# Patient Record
Sex: MALE | Race: WHITE | NOT HISPANIC OR LATINO | Employment: FULL TIME | URBAN - METROPOLITAN AREA
[De-identification: names, ages, dates, MRNs, and addresses within clinical notes are randomized per-mention and may not be internally consistent; named-entity substitution may affect disease eponyms.]

---

## 2018-01-24 ENCOUNTER — APPOINTMENT (EMERGENCY)
Dept: CT IMAGING | Facility: HOSPITAL | Age: 63
End: 2018-01-24
Payer: COMMERCIAL

## 2018-01-24 ENCOUNTER — HOSPITAL ENCOUNTER (EMERGENCY)
Facility: HOSPITAL | Age: 63
Discharge: HOME | End: 2018-01-24
Admitting: EMERGENCY MEDICINE
Payer: COMMERCIAL

## 2018-01-24 VITALS
WEIGHT: 238 LBS | HEIGHT: 69 IN | DIASTOLIC BLOOD PRESSURE: 66 MMHG | OXYGEN SATURATION: 98 % | HEART RATE: 59 BPM | RESPIRATION RATE: 18 BRPM | SYSTOLIC BLOOD PRESSURE: 125 MMHG | TEMPERATURE: 97.6 F | BODY MASS INDEX: 35.25 KG/M2

## 2018-01-24 DIAGNOSIS — H53.9 VISUAL DISTURBANCE: ICD-10-CM

## 2018-01-24 DIAGNOSIS — M62.82 RHABDOMYOLYSIS: Primary | ICD-10-CM

## 2018-01-24 LAB
ALBUMIN SERPL BCP-MCNC: 4.3 G/DL (ref 3.5–5)
ALP SERPL-CCNC: 58 U/L (ref 46–116)
ALT SERPL W P-5'-P-CCNC: 42 U/L (ref 12–78)
ANION GAP SERPL CALCULATED.3IONS-SCNC: 10 MMOL/L (ref 4–13)
APTT PPP: 31 SECONDS (ref 23–35)
AST SERPL W P-5'-P-CCNC: 42 U/L (ref 5–45)
BACTERIA UR QL AUTO: ABNORMAL /HPF
BASOPHILS # BLD AUTO: 0.03 THOUSANDS/ΜL (ref 0–0.1)
BASOPHILS NFR BLD AUTO: 1 % (ref 0–1)
BILIRUB SERPL-MCNC: 0.41 MG/DL (ref 0.2–1)
BILIRUB UR QL STRIP: NEGATIVE
BUN SERPL-MCNC: 13 MG/DL (ref 5–25)
CALCIUM SERPL-MCNC: 8.9 MG/DL (ref 8.3–10.1)
CHLORIDE SERPL-SCNC: 103 MMOL/L (ref 100–108)
CK MB SERPL-MCNC: 2.5 NG/ML (ref 0–5)
CK MB SERPL-MCNC: 2.7 NG/ML (ref 0–5)
CK MB SERPL-MCNC: <1 % (ref 0–2.5)
CK MB SERPL-MCNC: <1 % (ref 0–2.5)
CK SERPL-CCNC: 1191 U/L (ref 39–308)
CK SERPL-CCNC: 928 U/L (ref 39–308)
CLARITY UR: CLEAR
CLARITY, POC: CLEAR
CO2 SERPL-SCNC: 28 MMOL/L (ref 21–32)
COLOR UR: YELLOW
COLOR, POC: YELLOW
CREAT SERPL-MCNC: 1.19 MG/DL (ref 0.6–1.3)
EOSINOPHIL # BLD AUTO: 0.14 THOUSAND/ΜL (ref 0–0.61)
EOSINOPHIL NFR BLD AUTO: 2 % (ref 0–6)
ERYTHROCYTE [DISTWIDTH] IN BLOOD BY AUTOMATED COUNT: 13.2 % (ref 11.6–15.1)
GFR SERPL CREATININE-BSD FRML MDRD: 64 ML/MIN/1.73SQ M
GLUCOSE SERPL-MCNC: 108 MG/DL (ref 65–140)
GLUCOSE UR STRIP-MCNC: NEGATIVE MG/DL
HCT VFR BLD AUTO: 42.4 % (ref 36.5–49.3)
HGB BLD-MCNC: 14.6 G/DL (ref 12–17)
HGB UR QL STRIP.AUTO: ABNORMAL
INR PPP: 0.96 (ref 0.86–1.16)
KETONES UR STRIP-MCNC: NEGATIVE MG/DL
LEUKOCYTE ESTERASE UR QL STRIP: NEGATIVE
LYMPHOCYTES # BLD AUTO: 2.12 THOUSANDS/ΜL (ref 0.6–4.47)
LYMPHOCYTES NFR BLD AUTO: 33 % (ref 14–44)
MAGNESIUM SERPL-MCNC: 2 MG/DL (ref 1.6–2.6)
MCH RBC QN AUTO: 28.3 PG (ref 26.8–34.3)
MCHC RBC AUTO-ENTMCNC: 34.4 G/DL (ref 31.4–37.4)
MCV RBC AUTO: 82 FL (ref 82–98)
MONOCYTES # BLD AUTO: 0.57 THOUSAND/ΜL (ref 0.17–1.22)
MONOCYTES NFR BLD AUTO: 9 % (ref 4–12)
NEUTROPHILS # BLD AUTO: 3.65 THOUSANDS/ΜL (ref 1.85–7.62)
NEUTS SEG NFR BLD AUTO: 55 % (ref 43–75)
NITRITE UR QL STRIP: NEGATIVE
NON-SQ EPI CELLS URNS QL MICRO: ABNORMAL /HPF
NRBC BLD AUTO-RTO: 0 /100 WBCS
PH UR STRIP.AUTO: 6 [PH] (ref 4.5–8)
PLATELET # BLD AUTO: 169 THOUSANDS/UL (ref 149–390)
PMV BLD AUTO: 9.8 FL (ref 8.9–12.7)
POTASSIUM SERPL-SCNC: 3.8 MMOL/L (ref 3.5–5.3)
PROT SERPL-MCNC: 7.5 G/DL (ref 6.4–8.2)
PROT UR STRIP-MCNC: NEGATIVE MG/DL
PROTHROMBIN TIME: 12.8 SECONDS (ref 12.1–14.4)
RBC # BLD AUTO: 5.16 MILLION/UL (ref 3.88–5.62)
RBC #/AREA URNS AUTO: ABNORMAL /HPF
SODIUM SERPL-SCNC: 141 MMOL/L (ref 136–145)
SP GR UR STRIP.AUTO: 1.01 (ref 1–1.03)
UROBILINOGEN UR QL STRIP.AUTO: 0.2 E.U./DL
WBC # BLD AUTO: 6.51 THOUSAND/UL (ref 4.31–10.16)
WBC #/AREA URNS AUTO: ABNORMAL /HPF

## 2018-01-24 PROCEDURE — 96361 HYDRATE IV INFUSION ADD-ON: CPT

## 2018-01-24 PROCEDURE — 99284 EMERGENCY DEPT VISIT MOD MDM: CPT

## 2018-01-24 PROCEDURE — 82550 ASSAY OF CK (CPK): CPT | Performed by: NURSE PRACTITIONER

## 2018-01-24 PROCEDURE — 80053 COMPREHEN METABOLIC PANEL: CPT | Performed by: NURSE PRACTITIONER

## 2018-01-24 PROCEDURE — 82553 CREATINE MB FRACTION: CPT | Performed by: NURSE PRACTITIONER

## 2018-01-24 PROCEDURE — 96360 HYDRATION IV INFUSION INIT: CPT

## 2018-01-24 PROCEDURE — 81001 URINALYSIS AUTO W/SCOPE: CPT

## 2018-01-24 PROCEDURE — 81002 URINALYSIS NONAUTO W/O SCOPE: CPT | Performed by: NURSE PRACTITIONER

## 2018-01-24 PROCEDURE — 85610 PROTHROMBIN TIME: CPT | Performed by: NURSE PRACTITIONER

## 2018-01-24 PROCEDURE — 85730 THROMBOPLASTIN TIME PARTIAL: CPT | Performed by: NURSE PRACTITIONER

## 2018-01-24 PROCEDURE — 36415 COLL VENOUS BLD VENIPUNCTURE: CPT | Performed by: NURSE PRACTITIONER

## 2018-01-24 PROCEDURE — 85025 COMPLETE CBC W/AUTO DIFF WBC: CPT | Performed by: NURSE PRACTITIONER

## 2018-01-24 PROCEDURE — 70450 CT HEAD/BRAIN W/O DYE: CPT

## 2018-01-24 PROCEDURE — 83735 ASSAY OF MAGNESIUM: CPT | Performed by: NURSE PRACTITIONER

## 2018-01-24 RX ORDER — ROSUVASTATIN CALCIUM 5 MG/1
5 TABLET, COATED ORAL DAILY
COMMUNITY
End: 2018-05-30 | Stop reason: ALTCHOICE

## 2018-01-24 RX ORDER — TAMSULOSIN HYDROCHLORIDE 0.4 MG/1
0.4 CAPSULE ORAL
COMMUNITY

## 2018-01-24 RX ORDER — LISINOPRIL 20 MG/1
20 TABLET ORAL DAILY
COMMUNITY
End: 2018-05-30 | Stop reason: ALTCHOICE

## 2018-01-24 RX ORDER — ASPIRIN 81 MG/1
81 TABLET ORAL DAILY
COMMUNITY
End: 2018-05-30 | Stop reason: ALTCHOICE

## 2018-01-24 RX ORDER — AMLODIPINE BESYLATE 10 MG/1
10 TABLET ORAL DAILY
COMMUNITY
End: 2018-05-30 | Stop reason: ALTCHOICE

## 2018-01-24 RX ADMIN — SODIUM CHLORIDE 1000 ML: 0.9 INJECTION, SOLUTION INTRAVENOUS at 21:18

## 2018-01-24 RX ADMIN — SODIUM CHLORIDE 1000 ML: 0.9 INJECTION, SOLUTION INTRAVENOUS at 21:24

## 2018-01-25 NOTE — DISCHARGE INSTRUCTIONS
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  Use saline nasal spray for sinus congestion  Rhabdomyolysis   WHAT YOU NEED TO KNOW:   Rhabdomyolysis is a condition where injured muscles release harmful substances into the bloodstream  These substances include potassium, phosphate, creatinine kinase, and myoglobin  Large amounts of these substances may damage your kidneys and other organs  DISCHARGE INSTRUCTIONS:   Call 911 if:   · You have chest pain  · Your heart is beating faster than usual or has a strange rhythm  Return to the emergency department if:   · Your urine is dark or tea-colored or has blood in it  · You have pain, swelling, or weakness in your arms or legs that does not go away or gets worse  · You are urinating less than usual or not able to urinate  Contact your healthcare provider if:   · You have questions or concerns about your condition or care  Follow up with your healthcare provider as directed: You may need to return to have blood tests done  Write down your questions so you remember to ask them during your visits  Self-care:   · Drink liquids as directed  Ask how much liquid to drink each day and which liquids are best for you  Drink more liquids if you are doing strenuous work, exercise, and if it is warm outside  Liquids help flush substances from your body  · Do not drink alcohol  Heavy alcohol use may increase your risk for rhabdomyolysis  © 2017 [address] Information is for End User's use only and may not be sold, redistributed or otherwise used for commercial purposes   All illustrations and images included in CareNotes® are the copyrighted property of [employer]  or Oscar Navarrete  The above information is an  only  It is not intended as medical advice for individual conditions or treatments  Talk to your doctor, nurse or pharmacist before following any medical regimen to see if it is safe and effective for you  Blurred Vision   WHAT YOU NEED TO KNOW:   Blurred vision is when you cannot see fine details  You may have blurred vision if you are nearsighted or farsighted and you need glasses  Blurred vision may be caused by a corneal abrasion (scratch on the cornea) or a corneal ulcer (open sore)  You may have blurred vision if your eye came into contact with a chemical  A foreign body or infection may also cause blurred vision  Medical conditions, such as cataracts, glaucoma, detached retina, and nerve disorders can also cause blurred vision  Blurred vision may also be caused by a concussion or a tumor  If you have diabetes, you may develop diabetic retinopathy  Diabetic retinopathy damages the blood vessels of your retina  DISCHARGE INSTRUCTIONS:   Return to the emergency department if:   · You have weakness in an arm or leg, difficulty speaking or seeing, and a severe headache  · You have a fever, eye pain, or discharge  · You have a sudden loss of vision  Contact your healthcare provider if:   · Your blurred vision gets worse  · Your blurred vision is worse in the morning  · You have a sudden headache or eye pain  · Your eye has swelling, redness, or discharge  · You see floaters, flashes of light, fine dots, or cobweb shapes  · You have questions or concerns about your condition or care  Medicines: You may  need any of the following:  · Prescription pain medicine  may be given  Ask how to take this medicine safely  · Antibiotics  help prevent or treat an eye infection caused by bacteria  It may be given as eyedrops or an ointment  · Take your medicine as directed  Contact your healthcare provider if you think your medicine is not helping or if you have side effects  Tell him of her if you are allergic to any medicine  Keep a list of the medicines, vitamins, and herbs you take  Include the amounts, and when and why you take them  Bring the list or the pill bottles to follow-up visits  Carry your medicine list with you in case of an emergency  Manage your blurred vision:  Your healthcare provider may ask you to do any of the following:  · Use artificial tears  to keep your eye moist or to soothe your irritated eye  · Apply a cool compress  to decrease any swelling or pain  Wet a clean washcloth with cool water and place it on your eye  Use the cool compress as often as directed  · Wear an eye patch as directed  to protect your eye  Follow up with your healthcare provider as directed: You may need other eye exams and medicines  Write down your questions so you remember to ask them during your visits  © 2017 [address]  Information is for End User's use only and may not be sold, redistributed or otherwise used for commercial purposes  All illustrations and images included in CareNotes® are the copyrighted property of [employer]  or [address]  The above information is an  only  It is not intended as medical advice for individual conditions or treatments  Talk to your doctor, nurse or pharmacist before following any medical regimen to see if it is safe and effective for you

## 2018-05-30 ENCOUNTER — OFFICE VISIT (OUTPATIENT)
Dept: URGENT CARE | Age: 63
End: 2018-05-30
Payer: COMMERCIAL

## 2018-05-30 VITALS
OXYGEN SATURATION: 97 % | RESPIRATION RATE: 16 BRPM | SYSTOLIC BLOOD PRESSURE: 179 MMHG | WEIGHT: 238 LBS | TEMPERATURE: 97.1 F | DIASTOLIC BLOOD PRESSURE: 90 MMHG | HEART RATE: 58 BPM | BODY MASS INDEX: 35.25 KG/M2 | HEIGHT: 69 IN

## 2018-05-30 DIAGNOSIS — J01.20 ACUTE NON-RECURRENT ETHMOIDAL SINUSITIS: Primary | ICD-10-CM

## 2018-05-30 PROCEDURE — 99203 OFFICE O/P NEW LOW 30 MIN: CPT | Performed by: PHYSICIAN ASSISTANT

## 2018-05-30 PROCEDURE — S9088 SERVICES PROVIDED IN URGENT: HCPCS | Performed by: PHYSICIAN ASSISTANT

## 2018-05-30 RX ORDER — BENZONATATE 100 MG/1
100 CAPSULE ORAL 3 TIMES DAILY PRN
Qty: 15 CAPSULE | Refills: 0 | Status: SHIPPED | OUTPATIENT
Start: 2018-05-30 | End: 2018-09-29

## 2018-05-30 RX ORDER — AZITHROMYCIN 250 MG/1
TABLET, FILM COATED ORAL
Qty: 6 TABLET | Refills: 0 | Status: SHIPPED | OUTPATIENT
Start: 2018-05-30 | End: 2018-06-03

## 2018-05-30 RX ORDER — FLUTICASONE PROPIONATE 50 MCG
2 SPRAY, SUSPENSION (ML) NASAL DAILY
Qty: 16 G | Refills: 0 | Status: SHIPPED | OUTPATIENT
Start: 2018-05-30 | End: 2018-09-29

## 2018-05-30 NOTE — PROGRESS NOTES
[facility]        NAME: Merced Mendosa is a 58 y o  male  : 1955    MRN: [id number]  DATE: May 30, 2018  TIME: 11:48 AM    Assessment and Plan   Acute non-recurrent ethmoidal sinusitis [J01 20]  1  Acute non-recurrent ethmoidal sinusitis  azithromycin (ZITHROMAX) 250 mg tablet    benzonatate (TESSALON PERLES) 100 mg capsule    fluticasone (FLONASE) 50 mcg/act nasal spray         Patient Instructions     Continue all medications  Motrin and/or Tylenol as needed for fever and pain  Follow up with PCP in 3-5 days  Proceed to  ER if symptoms worsen  Chief Complaint     Chief Complaint   Patient presents with    Facial Pain     sinus pressure and pain about 1 week, fever last weekened, nasuea, cough with mucus, squeaking in ears and some ear pain in both ears         History of Present Illness       For one week sinus pressure, fever, cough and ear congestion      URI    This is a new problem  The current episode started in the past 7 days  The problem has been waxing and waning  Maximum temperature: subjective  The fever has been present for 1 to 2 days  Associated symptoms include congestion, coughing, ear pain, a plugged ear sensation, rhinorrhea and sinus pain  Pertinent negatives include no chest pain, diarrhea, dysuria, headaches, joint swelling, nausea, neck pain, rash, sore throat, swollen glands, vomiting or wheezing  He has tried nothing for the symptoms  Review of Systems   Review of Systems   Constitutional: Positive for fever  Negative for fatigue  HENT: Positive for congestion, ear pain, rhinorrhea and sinus pain  Negative for sore throat  Eyes: Negative  Respiratory: Positive for cough  Negative for wheezing  Cardiovascular: Negative  Negative for chest pain  Gastrointestinal: Negative  Negative for diarrhea, nausea and vomiting  Genitourinary: Negative for dysuria  Musculoskeletal: Negative  Negative for neck pain  Skin: Negative  Negative for rash  Neurological: Negative  Negative for headaches  Current Medications       Current Outpatient Prescriptions:     metoprolol tartrate (LOPRESSOR) 25 mg tablet, Take 25 mg by mouth every 12 (twelve) hours, Disp: , Rfl:     tamsulosin (FLOMAX) 0 4 mg, Take 0 4 mg by mouth daily with dinner, Disp: , Rfl:     azithromycin (ZITHROMAX) 250 mg tablet, Take 2 tablets today then 1 tablet daily x 4 days, Disp: 6 tablet, Rfl: 0    benzonatate (TESSALON PERLES) 100 mg capsule, Take 1 capsule (100 mg total) by mouth 3 (three) times a day as needed for cough, Disp: 15 capsule, Rfl: 0    fluticasone (FLONASE) 50 mcg/act nasal spray, 2 sprays into each nostril daily, Disp: 16 g, Rfl: 0    Current Allergies     Allergies as of 05/30/2018 - Reviewed 05/30/2018   Allergen Reaction Noted    Statins Other (See Comments) 01/25/2018            The following portions of the patient's history were reviewed and updated as appropriate: allergies, current medications, past family history, past medical history, past social history, past surgical history and problem list      Past Medical History:   Diagnosis Date    Hyperlipidemia     Hypertension     Renal disorder        Past Surgical History:   Procedure Laterality Date    CARPAL TUNNEL RELEASE      HERNIA REPAIR      KNEE ARTHROSCOPY      TONSILLECTOMY         No family history on file  Medications have been verified  Objective   BP (!) 179/90 (BP Location: Left arm, Patient Position: Sitting, Cuff Size: Adult)   Pulse 58   Temp (!) 97 1 °F (36 2 °C) (Tympanic)   Resp 16   Ht 5' 9" (1 753 m)   Wt 108 kg (238 lb)   SpO2 97%   BMI 35 15 kg/m²        Physical Exam     Physical Exam   Constitutional: He is oriented to person, place, and time  He appears well-developed and well-nourished  No distress  HENT:   Head: Normocephalic and atraumatic     Right Ear: External ear normal    Left Ear: External ear normal    Nose: Nose normal    Mouth/Throat: Oropharynx is clear and moist  No oropharyngeal exudate  Eyes: Conjunctivae are normal  Right eye exhibits no discharge  Left eye exhibits no discharge  Neck: Normal range of motion  Neck supple  Cardiovascular: Normal rate, regular rhythm, normal heart sounds and intact distal pulses  No murmur heard  Pulmonary/Chest: Effort normal and breath sounds normal  No respiratory distress  He has no rales  Abdominal: Soft  Bowel sounds are normal  There is no tenderness  Musculoskeletal: Normal range of motion  Lymphadenopathy:     He has no cervical adenopathy  Neurological: He is alert and oriented to person, place, and time  Skin: Skin is warm and dry  Psychiatric: He has a normal mood and affect  Nursing note and vitals reviewed

## 2018-05-30 NOTE — PATIENT INSTRUCTIONS
Continue all medications  Motrin and/or Tylenol as needed for fever and pain  Follow up with PCP in 3-5 days  Proceed to  ER if symptoms worsen     Sinusitis   AMBULATORY CARE:   Sinusitis  is inflammation or infection of your sinuses  It is most often caused by a virus  Acute sinusitis may last up to 12 weeks  Chronic sinusitis lasts longer than 12 weeks  Recurrent sinusitis means you have 4 or more times in 1 year  Common symptoms include the following:   · Fever    · Pain, pressure, redness, or swelling around the forehead, cheeks, or eyes    · Thick yellow or green discharge from your nose    · Tenderness when you touch your face over your sinuses    · Dry cough that happens mostly at night or when you lie down    · Headache and face pain that is worse when you lean forward    · Tooth pain, or pain when you chew  Seek care immediately if:   · Your eye and eyelid are red, swollen, and painful  · You cannot open your eye  · You have vision changes, such as double vision  · Your eyeball bulges out or you cannot move your eye  · You are more sleepy than normal, or you notice changes in your ability to think, move, or talk  · You have a stiff neck, a fever, or a bad headache  · You have swelling of your forehead or scalp  Contact your healthcare provider if:   · Your symptoms do not improve after 3 days  · Your symptoms do not go away after 10 days  · You have nausea and are vomiting  · Your nose is bleeding  · You have questions or concerns about your condition or care  Treatment for sinusitis:  Your symptoms may go away on their own  Your healthcare provider may recommend watchful waiting for up to 10 days before starting antibiotics  You may  need any of the following:  · Acetaminophen  decreases pain and fever  It is available without a doctor's order  Ask how much to take and how often to take it  Follow directions   Read the labels of all other medicines you are using to see if they also contain acetaminophen, or ask your doctor or pharmacist  Acetaminophen can cause liver damage if not taken correctly  Do not use more than 4 grams (4,000 milligrams) total of acetaminophen in one day  · NSAIDs , such as ibuprofen, help decrease swelling, pain, and fever  This medicine is available with or without a doctor's order  NSAIDs can cause stomach bleeding or kidney problems in certain people  If you take blood thinner medicine, always ask your healthcare provider if NSAIDs are safe for you  Always read the medicine label and follow directions  · Nasal steroid sprays  may help decrease inflammation in your nose and sinuses  · Decongestants  help reduce swelling and drain mucus in the nose and sinuses  They may help you breathe easier  · Antihistamines  help dry mucus in the nose and relieve sneezing  · Antibiotics  help treat or prevent a bacterial infection  · Take your medicine as directed  Contact your healthcare provider if you think your medicine is not helping or if you have side effects  Tell him or her if you are allergic to any medicine  Keep a list of the medicines, vitamins, and herbs you take  Include the amounts, and when and why you take them  Bring the list or the pill bottles to follow-up visits  Carry your medicine list with you in case of an emergency  Self-care:   · Rinse your sinuses  Use a sinus rinse device to rinse your nasal passages with a saline (salt water) solution or distilled water  Do not use tap water  This will help thin the mucus in your nose and rinse away pollen and dirt  It will also help reduce swelling so you can breathe normally  Ask your healthcare provider how often to do this  · Breathe in steam   Heat a bowl of water until you see steam  Lean over the bowl and make a tent over your head with a large towel  Breathe deeply for about 20 minutes  Be careful not to get too close to the steam or burn yourself   Do this 3 times a day  You can also breathe deeply when you take a hot shower  · Sleep with your head elevated  Place an extra pillow under your head before you go to sleep to help your sinuses drain  · Drink liquids as directed  Ask your healthcare provider how much liquid to drink each day and which liquids are best for you  Liquids will thin the mucus in your nose and help it drain  Avoid drinks that contain alcohol or caffeine  · Do not smoke, and avoid secondhand smoke  Nicotine and other chemicals in cigarettes and cigars can make your symptoms worse  Ask your healthcare provider for information if you currently smoke and need help to quit  E-cigarettes or smokeless tobacco still contain nicotine  Talk to your healthcare provider before you use these products  Prevent the spread of germs that cause sinusitis:  Wash your hands often with soap and water  Wash your hands after you use the bathroom, change a child's diaper, or sneeze  Wash your hands before you prepare or eat food  Follow up with your healthcare provider as directed: You may be referred to an ear, nose, and throat specialist  Write down your questions so you remember to ask them during your visits  © 2017 [facility] Information is for End User's use only and may not be sold, redistributed or otherwise used for commercial purposes  All illustrations and images included in CareNotes® are the copyrighted property of [employer]  or [employer]  The above information is an  only  It is not intended as medical advice for individual conditions or treatments  Talk to your doctor, nurse or pharmacist before following any medical regimen to see if it is safe and effective for you

## 2018-09-29 ENCOUNTER — APPOINTMENT (EMERGENCY)
Dept: CT IMAGING | Facility: HOSPITAL | Age: 63
End: 2018-09-29
Payer: COMMERCIAL

## 2018-09-29 ENCOUNTER — HOSPITAL ENCOUNTER (EMERGENCY)
Facility: HOSPITAL | Age: 63
Discharge: HOME | End: 2018-09-29
Attending: EMERGENCY MEDICINE | Admitting: EMERGENCY MEDICINE
Payer: COMMERCIAL

## 2018-09-29 VITALS
SYSTOLIC BLOOD PRESSURE: 147 MMHG | OXYGEN SATURATION: 98 % | TEMPERATURE: 98 F | WEIGHT: 233 LBS | HEART RATE: 59 BPM | DIASTOLIC BLOOD PRESSURE: 67 MMHG | RESPIRATION RATE: 16 BRPM | BODY MASS INDEX: 34.41 KG/M2

## 2018-09-29 DIAGNOSIS — K52.9 ACUTE COLITIS: Primary | ICD-10-CM

## 2018-09-29 DIAGNOSIS — K62.5 RECTAL BLEEDING: ICD-10-CM

## 2018-09-29 LAB
ALBUMIN SERPL BCP-MCNC: 3.6 G/DL (ref 3.5–5)
ALP SERPL-CCNC: 57 U/L (ref 46–116)
ALT SERPL W P-5'-P-CCNC: 27 U/L (ref 12–78)
ANION GAP SERPL CALCULATED.3IONS-SCNC: 7 MMOL/L (ref 4–13)
AST SERPL W P-5'-P-CCNC: 18 U/L (ref 5–45)
BASOPHILS # BLD AUTO: 0.05 THOUSANDS/ΜL (ref 0–0.1)
BASOPHILS NFR BLD AUTO: 1 % (ref 0–1)
BILIRUB SERPL-MCNC: 0.35 MG/DL (ref 0.2–1)
BUN SERPL-MCNC: 15 MG/DL (ref 5–25)
CALCIUM SERPL-MCNC: 8.9 MG/DL (ref 8.3–10.1)
CHLORIDE SERPL-SCNC: 103 MMOL/L (ref 100–108)
CO2 SERPL-SCNC: 31 MMOL/L (ref 21–32)
CREAT SERPL-MCNC: 1.07 MG/DL (ref 0.6–1.3)
EOSINOPHIL # BLD AUTO: 0.1 THOUSAND/ΜL (ref 0–0.61)
EOSINOPHIL NFR BLD AUTO: 2 % (ref 0–6)
ERYTHROCYTE [DISTWIDTH] IN BLOOD BY AUTOMATED COUNT: 14 % (ref 11.6–15.1)
GFR SERPL CREATININE-BSD FRML MDRD: 73 ML/MIN/1.73SQ M
GLUCOSE SERPL-MCNC: 104 MG/DL (ref 65–140)
HCT VFR BLD AUTO: 44.5 % (ref 36.5–49.3)
HGB BLD-MCNC: 14.8 G/DL (ref 12–17)
IMM GRANULOCYTES # BLD AUTO: 0.01 THOUSAND/UL (ref 0–0.2)
IMM GRANULOCYTES NFR BLD AUTO: 0 % (ref 0–2)
LYMPHOCYTES # BLD AUTO: 1.33 THOUSANDS/ΜL (ref 0.6–4.47)
LYMPHOCYTES NFR BLD AUTO: 20 % (ref 14–44)
MCH RBC QN AUTO: 27.4 PG (ref 26.8–34.3)
MCHC RBC AUTO-ENTMCNC: 33.3 G/DL (ref 31.4–37.4)
MCV RBC AUTO: 82 FL (ref 82–98)
MONOCYTES # BLD AUTO: 0.52 THOUSAND/ΜL (ref 0.17–1.22)
MONOCYTES NFR BLD AUTO: 8 % (ref 4–12)
NEUTROPHILS # BLD AUTO: 4.75 THOUSANDS/ΜL (ref 1.85–7.62)
NEUTS SEG NFR BLD AUTO: 69 % (ref 43–75)
NRBC BLD AUTO-RTO: 0 /100 WBCS
PLATELET # BLD AUTO: 176 THOUSANDS/UL (ref 149–390)
PMV BLD AUTO: 10 FL (ref 8.9–12.7)
POTASSIUM SERPL-SCNC: 3.5 MMOL/L (ref 3.5–5.3)
PROT SERPL-MCNC: 6.8 G/DL (ref 6.4–8.2)
RBC # BLD AUTO: 5.41 MILLION/UL (ref 3.88–5.62)
SODIUM SERPL-SCNC: 141 MMOL/L (ref 136–145)
WBC # BLD AUTO: 6.76 THOUSAND/UL (ref 4.31–10.16)

## 2018-09-29 PROCEDURE — 74177 CT ABD & PELVIS W/CONTRAST: CPT

## 2018-09-29 PROCEDURE — 80053 COMPREHEN METABOLIC PANEL: CPT | Performed by: EMERGENCY MEDICINE

## 2018-09-29 PROCEDURE — 96360 HYDRATION IV INFUSION INIT: CPT

## 2018-09-29 PROCEDURE — 99285 EMERGENCY DEPT VISIT HI MDM: CPT

## 2018-09-29 PROCEDURE — 85025 COMPLETE CBC W/AUTO DIFF WBC: CPT | Performed by: EMERGENCY MEDICINE

## 2018-09-29 PROCEDURE — 36415 COLL VENOUS BLD VENIPUNCTURE: CPT | Performed by: EMERGENCY MEDICINE

## 2018-09-29 PROCEDURE — 96361 HYDRATE IV INFUSION ADD-ON: CPT

## 2018-09-29 RX ORDER — ASPIRIN 81 MG/1
81 TABLET, CHEWABLE ORAL DAILY
COMMUNITY

## 2018-09-29 RX ORDER — LISINOPRIL 20 MG/1
20 TABLET ORAL DAILY
COMMUNITY

## 2018-09-29 RX ORDER — DICYCLOMINE HCL 20 MG
20 TABLET ORAL EVERY 6 HOURS PRN
Qty: 10 TABLET | Refills: 0 | Status: ON HOLD | OUTPATIENT
Start: 2018-09-29 | End: 2020-02-18 | Stop reason: ALTCHOICE

## 2018-09-29 RX ADMIN — IOHEXOL 100 ML: 350 INJECTION, SOLUTION INTRAVENOUS at 11:29

## 2018-09-29 RX ADMIN — SODIUM CHLORIDE 1000 ML: 0.9 INJECTION, SOLUTION INTRAVENOUS at 10:32

## 2018-09-29 NOTE — DISCHARGE INSTRUCTIONS
Colitis   WHAT YOU NEED TO KNOW:   Colitis is swelling and irritation of your colon  Colitis may be caused by ulcers or a problem with your immune system  Bacteria, a virus, or a parasite may also cause colitis  The cause may not be known  You may have diarrhea, abdominal pain, fever, or blood or mucus in your bowel movement  DISCHARGE INSTRUCTIONS:   Return to the emergency department if:   · You have sudden trouble breathing  · Your bowel movements are black or have blood in them  · You have blood in your vomit  · You have severe abdominal pain or your abdomen is swollen and feels hard  · You have any of the following signs of dehydration:     ¨ Dizziness or weakness    ¨ Dry mouth, cracked lips, or severe thirst    ¨ Fast heartbeat or breathing    ¨ Urinating very little or not at all  Contact your healthcare provider if:   · Your symptoms get worse or do not go away  · You have a fever, chills, cough, or feel weak and achy  · You suddenly lose weight without trying  · You have questions or concerns about your condition or care  Medicines:   · Medicines  may be given to decrease inflammation in your colon and treat diarrhea  · Take your medicine as directed  Contact your healthcare provider if you think your medicine is not helping or if you have side effects  Tell him of her if you are allergic to any medicine  Keep a list of the medicines, vitamins, and herbs you take  Include the amounts, and when and why you take them  Bring the list or the pill bottles to follow-up visits  Carry your medicine list with you in case of an emergency  Manage your symptoms:   · Drink liquids as directed  to help prevent dehydration  Good liquids to drink include water, juice, and broth  Ask how much liquid to drink each day  You may need to drink an oral rehydration solution (ORS)  An ORS contains a balance of water, salt, and sugar to replace body fluids lost during diarrhea       · Eat a variety of healthy foods  Healthy foods include fruits, vegetables, whole-grain breads, beans, low-fat dairy products, lean meats, and fish  You may need to eat several small meals throughout the day instead of large meals  Avoid spicy foods, caffeine, chocolate, and foods high in fat  · Talk to your healthcare provider before you take NSAIDs  NSAIDs can cause worsen your symptoms if ulcers are causing your colitis  · Start to exercise when you feel better  Regular exercise helps your bowels work normally  Ask about the best exercise plan for you  Follow up with your healthcare provider as directed: You may need to return for a colonoscopy or other tests  Write down how often you have a bowel movements and what they look like  Bring this to your follow-up visits  Write down your questions so you remember to ask them during your visits  © 2017 [address] [employer] Information is for End User's use only and may not be sold, redistributed or otherwise used for commercial purposes  All illustrations and images included in CareNotes® are the copyrighted property of [employer]  or [employer]  The above information is an  only  It is not intended as medical advice for individual conditions or treatments  Talk to your doctor, nurse or pharmacist before following any medical regimen to see if it is safe and effective for you  Rectal Bleeding   WHAT YOU NEED TO KNOW:   Rectal bleeding can be caused by constipation, hemorrhoids, or anal fissures  It may also be caused by polyps, tumors, or medical conditions, such as colitis or diverticulitis  DISCHARGE INSTRUCTIONS:   Medicines:   · Pain medicine: You may be given medicine to take away or decrease pain  Do not wait until the pain is severe before you take your medicine  · Iron supplement:  Iron helps your body make more red blood cells  · Steroids: This medicine decreases inflammation in your rectum   It may be applied as a cream, ointment, or lotion  · Take your medicine as directed  Contact your healthcare provider if you think your medicine is not helping or if you have side effects  Tell him of her if you are allergic to any medicine  Keep a list of the medicines, vitamins, and herbs you take  Include the amounts, and when and why you take them  Bring the list or the pill bottles to follow-up visits  Carry your medicine list with you in case of an emergency  Follow up with your healthcare provider as directed:  Write down your questions so you remember to ask them during your visits  Drink liquids as directed:  Ask your healthcare provider how much liquid to drink each day and which liquids are best for you  This will help prevent dehydration and constipation  Contact your healthcare provider if:   · You have a fever  · Your rectal bleeding stopped for a time, but has started again  · You have nausea  · You have cold, sweaty, pale skin  · You have changes in your bowel movements, such as diarrhea  · You have questions or concerns about your condition or care  Return to the emergency department if:   · You are breathing faster than usual     · You are dizzy, lightheaded, or feel faint  · You are confused or cannot think clearly  · You urinate less than usual or not at all  · Your rectal bleeding is constant or heavy  · You have severe abdominal pain or cramping  © 2017 [address]  Information is for End User's use only and may not be sold, redistributed or otherwise used for commercial purposes  All illustrations and images included in CareNotes® are the copyrighted property of [employer]  or [employer]  The above information is an  only  It is not intended as medical advice for individual conditions or treatments  Talk to your doctor, nurse or pharmacist before following any medical regimen to see if it is safe and effective for you

## 2018-09-29 NOTE — ED PROVIDER NOTES
History  Chief Complaint   Patient presents with    Black or Bloody Stool     Pt reports had yesterday he has had 2 episodes of bright red blood in toilet after have a BM  Pt states this morning there was blood on the tissue when he wiped  Pt reports some nausea     54-year-old male with a history of hyperlipidemia, hypertension presents to the emergency department with episodes of rectal bleeding since yesterday  Patient states 2 days ago he went out to eat and had a cheese steak  He noticed in the morning that he was having some lower abdominal pain  He had a bowel movement and but he may have seen some blood but was not sure  He went to a fair and had a milkshake and then felt better  He states he walked around through the day  That evening he did notice some lower abdominal cramping and was having trouble having a bowel movement  He states he had a small bowel movement that looked dark in color  He noticed a little bit of blood in the toilet  This morning he woke up and had breakfast and coffee and then afterwards again had a bowel movement  When he wiped he noticed blood on the toilet paper  He has had no fevers or chills  No nausea or vomiting  Patient thinks his last colonoscopy was 2 years ago and was normal         History provided by:  Patient   used: No    Black or Bloody Stool   Quality: Hard stool with blood in the toilet and on toilet paper    Amount:  Scant  Duration:  1 day  Timing:  Intermittent  Chronicity:  New  Context: defecation    Context: not anal fissures, not constipation, not diarrhea, not hemorrhoids, not rectal pain and not spontaneously    Similar prior episodes: no    Relieved by:  None tried  Worsened by:  Defecation  Ineffective treatments:  None tried  Associated symptoms: abdominal pain    Associated symptoms: no dizziness, no fever, no hematemesis, no light-headedness, no loss of consciousness, no recent illness and no vomiting    Risk factors: no anticoagulant use, no hx of colorectal cancer, no hx of colorectal surgery, no hx of IBD, no liver disease, no NSAID use and no steroid use        Prior to Admission Medications   Prescriptions Last Dose Informant Patient Reported? Taking?   aspirin 81 mg chewable tablet   Yes Yes   Sig: Chew 81 mg daily   lisinopril (ZESTRIL) 20 mg tablet   Yes Yes   Sig: Take 20 mg by mouth daily   metoprolol tartrate (LOPRESSOR) 25 mg tablet   Yes Yes   Sig: Take 25 mg by mouth every 12 (twelve) hours   tamsulosin (FLOMAX) 0 4 mg   Yes Yes   Sig: Take 0 4 mg by mouth daily with dinner      Facility-Administered Medications: None       Past Medical History:   Diagnosis Date    Hyperlipidemia     Hypertension     Renal disorder        Past Surgical History:   Procedure Laterality Date    CARPAL TUNNEL RELEASE      HERNIA REPAIR      KNEE ARTHROSCOPY      TONSILLECTOMY         History reviewed  No pertinent family history  I have reviewed and agree with the history as documented  Social History   Substance Use Topics    Smoking status: Former Smoker    Smokeless tobacco: Never Used    Alcohol use Yes      Comment: occas        Review of Systems   Constitutional: Negative  Negative for fever  HENT: Negative  Eyes: Negative  Respiratory: Negative  Cardiovascular: Negative  Gastrointestinal: Positive for abdominal pain and blood in stool  Negative for abdominal distention, anal bleeding, constipation, diarrhea, hematemesis, nausea, rectal pain and vomiting  Genitourinary: Negative  Musculoskeletal: Negative for neck pain  Skin: Negative  Allergic/Immunologic: Negative  Neurological: Negative  Negative for dizziness, loss of consciousness, weakness, light-headedness, numbness and headaches  Hematological: Negative  Psychiatric/Behavioral: Negative  All other systems reviewed and are negative  Physical Exam  Physical Exam   Constitutional: He is oriented to person, place, and time  He appears well-developed and well-nourished  Non-toxic appearance  He does not have a sickly appearance  He does not appear ill  No distress  HENT:   Head: Normocephalic and atraumatic  Right Ear: External ear normal    Left Ear: External ear normal    Mouth/Throat: Oropharynx is clear and moist    Eyes: Pupils are equal, round, and reactive to light  Conjunctivae are normal  No scleral icterus  Cardiovascular: Normal rate, regular rhythm and normal heart sounds  Pulmonary/Chest: Effort normal and breath sounds normal    Abdominal: Soft  Normal appearance and bowel sounds are normal  He exhibits no distension and no mass  There is no tenderness  There is no rebound and no guarding  No hernia  Obese   Genitourinary: Rectal exam shows guaiac positive stool  Rectal exam shows no external hemorrhoid, no internal hemorrhoid, no fissure, no mass, no tenderness and anal tone normal    Genitourinary Comments: No gross blood   Neurological: He is alert and oriented to person, place, and time  He has normal strength and normal reflexes  He exhibits normal muscle tone  Skin: Skin is warm and dry  No rash noted  He is not diaphoretic  No erythema  No pallor  Psychiatric: He has a normal mood and affect  Nursing note and vitals reviewed        Vital Signs  ED Triage Vitals [09/29/18 0948]   Temperature Pulse Respirations Blood Pressure SpO2   97 6 °F (36 4 °C) (!) 54 18 155/83 97 %      Temp Source Heart Rate Source Patient Position - Orthostatic VS BP Location FiO2 (%)   Oral Monitor Sitting Right arm --      Pain Score       2           Vitals:    09/29/18 0948 09/29/18 1145   BP: 155/83 147/67   Pulse: (!) 54 59   Patient Position - Orthostatic VS: Sitting Lying       Visual Acuity      ED Medications  Medications   sodium chloride 0 9 % bolus 1,000 mL (0 mL Intravenous Stopped 9/29/18 1208)   iohexol (OMNIPAQUE) 350 MG/ML injection (MULTI-DOSE) 100 mL (100 mL Intravenous Given 9/29/18 1129)       Diagnostic Studies  Results Reviewed     Procedure Component Value Units Date/Time    Comprehensive metabolic panel [[id number]] Collected:  09/29/18 1032    Lab Status:  Final result Specimen:  Blood from Arm, Left Updated:  09/29/18 1112     Sodium 141 mmol/L      Potassium 3 5 mmol/L      Chloride 103 mmol/L      CO2 31 mmol/L      ANION GAP 7 mmol/L      BUN 15 mg/dL      Creatinine 1 07 mg/dL      Glucose 104 mg/dL      Calcium 8 9 mg/dL      AST 18 U/L      ALT 27 U/L      Alkaline Phosphatase 57 U/L      Total Protein 6 8 g/dL      Albumin 3 6 g/dL      Total Bilirubin 0 35 mg/dL      eGFR 73 ml/min/1 73sq m     Narrative:         National Kidney Disease Education Program recommendations are as follows:  GFR calculation is accurate only with a steady state creatinine  Chronic Kidney disease less than 60 ml/min/1 73 sq  meters  Kidney failure less than 15 ml/min/1 73 sq  meters  CBC and differential [[id number]] Collected:  09/29/18 1032    Lab Status:  Final result Specimen:  Blood from Arm, Left Updated:  09/29/18 1049     WBC 6 76 Thousand/uL      RBC 5 41 Million/uL      Hemoglobin 14 8 g/dL      Hematocrit 44 5 %      MCV 82 fL      MCH 27 4 pg      MCHC 33 3 g/dL      RDW 14 0 %      MPV 10 0 fL      Platelets 867 Thousands/uL      nRBC 0 /100 WBCs      Neutrophils Relative 69 %      Immat GRANS % 0 %      Lymphocytes Relative 20 %      Monocytes Relative 8 %      Eosinophils Relative 2 %      Basophils Relative 1 %      Neutrophils Absolute 4 75 Thousands/µL      Immature Grans Absolute 0 01 Thousand/uL      Lymphocytes Absolute 1 33 Thousands/µL      Monocytes Absolute 0 52 Thousand/µL      Eosinophils Absolute 0 10 Thousand/µL      Basophils Absolute 0 05 Thousands/µL                  CT abdomen pelvis with contrast   Final Result by Aleta West MD (09/29 1202)         1  There is thickening of the walls of the mid and distal descending colon compatible with acute colitis              Workstation performed: [id number]                    Procedures  Procedures       Phone Contacts  ED Phone Contact    ED Course                               MDM  Number of Diagnoses or Management Options  Diagnosis management comments: 58-year-old male presents complaining of bright red blood per rectum over the last day  He thinks he may have noticed some blood in the toilet after having bowel movements over the last day  Today he noticed bright red blood on the toilet paper when he wiped  He has been having intermittent lower abdominal cramping pain  No fevers or vomiting  On exam he appears well in no distress  His abdomen is obese and currently nontender  He does have a positive Hemoccult exam but no bright red blood  Will do CBC, CMP  Will order CT abdomen pelvis to rule out diverticulosis/diverticulitis  Ultimately patient may need to follow up with Gastroenterology for possible colonoscopy       Amount and/or Complexity of Data Reviewed  Clinical lab tests: ordered and reviewed  Tests in the radiology section of CPT®: ordered and reviewed      CritCare Time    Disposition  Final diagnoses:   Acute colitis   Rectal bleeding     Time reflects when diagnosis was documented in both MDM as applicable and the Disposition within this note     Time User Action Codes Description Comment    9/29/2018 12:08 PM Uli Pintos A Add [K52 9] Acute colitis     9/29/2018 12:08 PM Uli Pintos A Add [K62 5] Rectal bleeding       ED Disposition     ED Disposition Condition Comment    Discharge  Yecenia Ibanez discharge to home/self care      Condition at discharge: Good        Follow-up Information     Follow up With Specialties Details [id number] [facility] [facility] Schedule an appointment as soon as possible for a visit in 1 week Or return to the emergency department with any worsening symptoms [facility] [phone] Patient's Medications   Discharge Prescriptions    DICYCLOMINE (BENTYL) 20 MG TABLET    Take 1 tablet (20 mg total) by mouth every 6 (six) hours as needed (pain)       Start Date: 9/29/2018 End Date: --       Order Dose: 20 mg       Quantity: 10 tablet    Refills: 0     No discharge procedures on file      ED Provider  Electronically Signed by           Christine Pappas DO  09/29/18 4954

## 2019-05-18 ENCOUNTER — HOSPITAL ENCOUNTER (EMERGENCY)
Facility: HOSPITAL | Age: 64
Discharge: HOME | End: 2019-05-18
Attending: EMERGENCY MEDICINE | Admitting: EMERGENCY MEDICINE
Payer: COMMERCIAL

## 2019-05-18 VITALS
SYSTOLIC BLOOD PRESSURE: 134 MMHG | TEMPERATURE: 97.1 F | HEART RATE: 42 BPM | DIASTOLIC BLOOD PRESSURE: 72 MMHG | OXYGEN SATURATION: 99 % | RESPIRATION RATE: 16 BRPM | WEIGHT: 223.77 LBS | BODY MASS INDEX: 33.04 KG/M2

## 2019-05-18 DIAGNOSIS — R00.1 BRADYCARDIA: ICD-10-CM

## 2019-05-18 DIAGNOSIS — R51.9 HEADACHE: Primary | ICD-10-CM

## 2019-05-18 LAB — ERYTHROCYTE [SEDIMENTATION RATE] IN BLOOD: 1 MM/HOUR (ref 0–10)

## 2019-05-18 PROCEDURE — 36415 COLL VENOUS BLD VENIPUNCTURE: CPT | Performed by: EMERGENCY MEDICINE

## 2019-05-18 PROCEDURE — 99283 EMERGENCY DEPT VISIT LOW MDM: CPT

## 2019-05-18 PROCEDURE — 99282 EMERGENCY DEPT VISIT SF MDM: CPT | Performed by: EMERGENCY MEDICINE

## 2019-05-18 PROCEDURE — 85652 RBC SED RATE AUTOMATED: CPT | Performed by: EMERGENCY MEDICINE

## 2019-05-18 RX ORDER — CHLORAL HYDRATE 500 MG
1000 CAPSULE ORAL 2 TIMES DAILY
COMMUNITY

## 2019-05-18 RX ORDER — ACETAMINOPHEN 325 MG/1
650 TABLET ORAL ONCE
Status: COMPLETED | OUTPATIENT
Start: 2019-05-18 | End: 2019-05-18

## 2019-05-18 RX ORDER — IBUPROFEN 600 MG/1
600 TABLET ORAL ONCE
Status: COMPLETED | OUTPATIENT
Start: 2019-05-18 | End: 2019-05-18

## 2019-05-18 RX ADMIN — ACETAMINOPHEN 650 MG: 325 TABLET ORAL at 10:02

## 2019-05-18 RX ADMIN — IBUPROFEN 600 MG: 600 TABLET ORAL at 10:03

## 2019-10-06 ENCOUNTER — OFFICE VISIT (OUTPATIENT)
Dept: URGENT CARE | Facility: MEDICAL CENTER | Age: 64
End: 2019-10-06
Payer: OTHER MISCELLANEOUS

## 2019-10-06 VITALS
TEMPERATURE: 96.9 F | SYSTOLIC BLOOD PRESSURE: 124 MMHG | DIASTOLIC BLOOD PRESSURE: 70 MMHG | OXYGEN SATURATION: 99 % | RESPIRATION RATE: 16 BRPM | HEART RATE: 58 BPM

## 2019-10-06 DIAGNOSIS — S46.219A BICEPS TENDON TEAR: Primary | ICD-10-CM

## 2019-10-06 PROCEDURE — 99212 OFFICE O/P EST SF 10 MIN: CPT | Performed by: PHYSICIAN ASSISTANT

## 2019-10-06 PROCEDURE — S9088 SERVICES PROVIDED IN URGENT: HCPCS | Performed by: PHYSICIAN ASSISTANT

## 2019-10-06 RX ORDER — EZETIMIBE 10 MG/1
10 TABLET ORAL
COMMUNITY
Start: 2019-07-03 | End: 2020-07-02

## 2019-10-06 NOTE — PROGRESS NOTES
[facility]        NAME: Uzma Benedict is a 71 y o  male  : 1955    MRN: [id number]  DATE: 2019  TIME: 1:23 PM    Assessment and Plan   Biceps tendon tear [S46 219A]  1  Biceps tendon tear  Ambulatory referral to Orthopedic Surgery         Patient Instructions       Referred to Orthopedics for further evaluation and treatment  Chief Complaint     Chief Complaint   Patient presents with    Arm Pain     Pt states approx 1 1/2 weeks ago was lifting tire onto back of pickup truck when felt sharp "pop" right upper arm  States yesterday felt " cramp" in upper arm, noticed swelling to area and "hardness" to palpation  Taking tylenol for pain         History of Present Illness       Patient was lifting 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 about a week and half ago  He put the 1st 1 up without a problem and the 2nd 1 went towards the side and he felt and heard a pop in his biceps area  It hurt for few days and then quite it down he was more of a dull ache in the area  He then was working and was pulling and twisting a grounding wire and felt increasing discomfort in his right biceps area  No previous problems  Review of Systems   Review of Systems   All other systems reviewed and are negative          Current Medications       Current Outpatient Medications:     ezetimibe (ZETIA) 10 mg tablet, Take 10 mg by mouth daily, Disp: , Rfl:     lisinopril (ZESTRIL) 20 mg tablet, Take 20 mg by mouth daily, Disp: , Rfl:     Omega-3 Fatty Acids (FISH OIL) 1,000 mg, Take 1,000 mg by mouth 2 (two) times a day, Disp: , Rfl:     tamsulosin (FLOMAX) 0 4 mg, Take 0 4 mg by mouth daily with dinner, Disp: , Rfl:     aspirin 81 mg chewable tablet, Chew 81 mg daily, Disp: , Rfl:     dicyclomine (BENTYL) 20 mg tablet, Take 1 tablet (20 mg total) by mouth every 6 (six) hours as needed (pain) (Patient not taking: Reported on 10/6/2019), Disp: 10 tablet, Rfl: 0    metoprolol tartrate (LOPRESSOR) 25 mg tablet, Take 25 mg by mouth every 12 (twelve) hours, Disp: , Rfl:     vitamin A 7500 UNIT capsule, Take 7,500 Units by mouth daily, Disp: , Rfl:     Current Allergies     Allergies as of 10/06/2019 - Reviewed 10/06/2019   Allergen Reaction Noted    Statins Other (See Comments) 01/25/2018    Nsaids  10/06/2019            The following portions of the patient's history were reviewed and updated as appropriate: allergies, current medications, past family history, past medical history, past social history, past surgical history and problem list      Past Medical History:   Diagnosis Date    Hyperlipidemia     Hypertension     Renal disorder        Past Surgical History:   Procedure Laterality Date    CARPAL TUNNEL RELEASE      HERNIA REPAIR      KNEE ARTHROSCOPY      TONSILLECTOMY         History reviewed  No pertinent family history  Medications have been verified  Objective   /70 (BP Location: Left arm, Patient Position: Sitting, Cuff Size: Standard)   Pulse 58   Temp (!) 96 9 °F (36 1 °C) (Tympanic)   Resp 16   SpO2 99%        Physical Exam     Physical Exam   Constitutional: He appears well-developed and well-nourished  Cardiovascular: Normal rate, regular rhythm and normal heart sounds  Pulmonary/Chest: Effort normal and breath sounds normal    Nursing note and vitals reviewed  Right arm decreased strength and palpable defect over the proximal portion of the biceps

## 2019-10-07 ENCOUNTER — OFFICE VISIT (OUTPATIENT)
Dept: OBGYN CLINIC | Facility: MEDICAL CENTER | Age: 64
End: 2019-10-07
Attending: PHYSICIAN ASSISTANT
Payer: OTHER MISCELLANEOUS

## 2019-10-07 ENCOUNTER — APPOINTMENT (OUTPATIENT)
Dept: RADIOLOGY | Facility: MEDICAL CENTER | Age: 64
End: 2019-10-07
Payer: OTHER MISCELLANEOUS

## 2019-10-07 VITALS
BODY MASS INDEX: 33.18 KG/M2 | DIASTOLIC BLOOD PRESSURE: 83 MMHG | SYSTOLIC BLOOD PRESSURE: 127 MMHG | WEIGHT: 224 LBS | HEIGHT: 69 IN | HEART RATE: 62 BPM

## 2019-10-07 DIAGNOSIS — S46.219A BICEPS TENDON TEAR: ICD-10-CM

## 2019-10-07 DIAGNOSIS — S46.211A RUPTURE OF RIGHT PROXIMAL BICEPS TENDON, INITIAL ENCOUNTER: ICD-10-CM

## 2019-10-07 PROCEDURE — 99203 OFFICE O/P NEW LOW 30 MIN: CPT | Performed by: ORTHOPAEDIC SURGERY

## 2019-10-07 PROCEDURE — 73030 X-RAY EXAM OF SHOULDER: CPT

## 2019-10-07 NOTE — PROGRESS NOTES
Ortho Sports Medicine Shoulder New Patient Visit     Assesment:   71year old Male Right shoulder proximal bicep rupture, DOI: 10/5/19    Plan:    Conservative treatment:    Ice to shoulder 1-2 times daily, for 20 minutes at a time  Imaging: All imaging from today was reviewed by myself and explained to the patient  We will obtain an MRI of the shoulder to rule out rotator cuff pathology and degree of bicep rupture  Follow up in 1-2 weeks for MRI review  Will make a definitive treatment plan based on the results of the MRI  Injection:    No Injection planned at this time  Surgery: We did discuss surgical and nonsurgical options for the proximal biceps rupture  I discussed with him that shoulder and elbow function are not significantly affected by this injury and he certainly can consider living with it as is with formal physical therapy  We also discussed that he can get biceps cramping and he would likely have a visual cosmetic deformity of the biceps  He will consider the risks and the benefits of surgical and nonsurgical options and discuss this at the time of his next office visit with an MRI review  Follow up:    Return for 1 week after the MRI to discuss results  Discuss non-op vs op at next visit        Chief Complaint   Patient presents with    Right Shoulder - Pain       History of Present Illness: The patient is a 71 y o , right hand dominant male whose occupation is [employer] worker, referred to me by urgent care, seen in clinic for consultation of right shoulder pain  The patient denies a history of diabetes  The patient denies a history of thyroid disorder  Pain is located bicep cramping  The patient rates the pain as a 5/10  The pain has been present for 2 days  The patient sustained an injury on 9/28/19   The mechanism of injury was lifting injury at work    He stated that he was lifting snow tires at work, the one tire started to fall, he caught it against his right bicep and felt a 'straining sensation'  He stated that he was able to work the rest of the week without difficultly  Then on 10/5/19, he was hooking up the oil hose at work, supinated the right forearm and felt a sharp pain in the right bicep  He denies the bicep region becoming bruised  He stated that now he is experiencing a cramping sensation in the bicep that is present daily  Pain is improved by rest   Pain is aggravated by lifting   Symptoms include visible deformity  The patient denies weakness  The patient denies numbness and tingling  The patient has tried rest and ice  Shoulder Surgical History:  None    Past Medical, Social and Family History:  Past Medical History:   Diagnosis Date    Hyperlipidemia     Hypertension     Renal disorder      Past Surgical History:   Procedure Laterality Date    CARPAL TUNNEL RELEASE      HERNIA REPAIR      KNEE ARTHROSCOPY      TONSILLECTOMY       Allergies   Allergen Reactions    Statins Other (See Comments)     Rhabdomyolysis; Vision changes    Nsaids      Due to kidney      Current Outpatient Medications on File Prior to Visit   Medication Sig Dispense Refill    ezetimibe (ZETIA) 10 mg tablet Take 10 mg by mouth daily      lisinopril (ZESTRIL) 20 mg tablet Take 20 mg by mouth daily      Omega-3 Fatty Acids (FISH OIL) 1,000 mg Take 1,000 mg by mouth 2 (two) times a day      tamsulosin (FLOMAX) 0 4 mg Take 0 4 mg by mouth daily with dinner      vitamin A 7500 UNIT capsule Take 7,500 Units by mouth daily      aspirin 81 mg chewable tablet Chew 81 mg daily      dicyclomine (BENTYL) 20 mg tablet Take 1 tablet (20 mg total) by mouth every 6 (six) hours as needed (pain) (Patient not taking: Reported on 10/7/2019) 10 tablet 0    metoprolol tartrate (LOPRESSOR) 25 mg tablet Take 25 mg by mouth every 12 (twelve) hours       No current facility-administered medications on file prior to visit        Social History Socioeconomic History    Marital status: Single     Spouse name: Not on file    Number of children: Not on file    Years of education: Not on file    Highest education level: Not on file   Occupational History    Not on file   Social Needs    Financial resource strain: Not on file    Food insecurity:     Worry: Not on file     Inability: Not on file    Transportation needs:     Medical: Not on file     Non-medical: Not on file   Tobacco Use    Smoking status: Former Smoker    Smokeless tobacco: Never Used   Substance and Sexual Activity    Alcohol use: Yes     Comment: occas    Drug use: No    Sexual activity: Not on file   Lifestyle    Physical activity:     Days per week: Not on file     Minutes per session: Not on file    Stress: Not on file   Relationships    Social connections:     Talks on phone: Not on file     Gets together: Not on file     Attends Yarsani service: Not on file     Active member of club or organization: Not on file     Attends meetings of clubs or organizations: Not on file     Relationship status: Not on file    Intimate partner violence:     Fear of current or ex partner: Not on file     Emotionally abused: Not on file     Physically abused: Not on file     Forced sexual activity: Not on file   Other Topics Concern    Not on file   Social History Narrative    Not on file         I have reviewed the past medical, surgical, social and family history, medications and allergies as documented in the EMR  Review of systems: ROS is negative other than that noted in the HPI  Constitutional: Negative for fatigue and fever  HENT: Negative for sore throat  Respiratory: Negative for shortness of breath  Cardiovascular: Negative for chest pain  Gastrointestinal: Negative for abdominal pain  Endocrine: Negative for cold intolerance and heat intolerance  Genitourinary: Negative for flank pain  Musculoskeletal: Negative for back pain  Skin: Negative for rash  Allergic/Immunologic: Negative for immunocompromised state  Neurological: Negative for dizziness  Psychiatric/Behavioral: Negative for agitation  Physical Exam:    Blood pressure 127/83, pulse 62, height 5' 9" (1 753 m), weight 102 kg (224 lb)      General/Constitutional: NAD, well developed, well nourished  HENT: Normocephalic, atraumatic  CV: Intact distal pulses, regular rate  Resp: No respiratory distress or labored breathing  Lymphatic: No lymphadenopathy palpated  Neuro: Alert and Oriented x 3, no focal deficits  Psych: Normal mood, normal affect, normal judgement, normal behavior  Skin: Warm, dry, no rashes, no erythema      Shoulder focused exam:       RIGHT LEFT    Scapula Atrophy Negative Negative     Winging Negative Negative     Protraction Negative Negative    Rotator cuff SS 5/5 5/5     IS 5/5 5/5     SubS 5/5 5/5    ROM     170     ER0 60 60     ER90 90    90     IR90 T6    T6     IRb T6    T6    TTP: AC Negative Negative     Biceps Positive Negative     Coracoid Negative Negative    Special Tests: O'Briens Negative Negative     Jacobo-shear Negative Negative     Cross body Adduction Negative Negative     Speeds  Negative Negative     Tuan's Negative Negative     Whipple Negative Negative       Neer Negative Negative     Rowe Negative Negative    Instability: Apprehension & relocation not tested not tested     Load & shift not tested not tested    Other: Crank Negative Negative             RUE: visible tal deformity and distal bicep insertion intact     Negative hook test right biceps  UE NV Exam: +2 Radial pulses bilaterally  Sensation intact to light touch C5-T1 bilaterally, Radial/median/ulnar nerve motor intact      Bilateral elbow, wrist, and and forearm ROM full, painless with passive ROM, no ttp or crepitance throughout extremities below shoulder joint    Cervical ROM is full without pain, numbness or tingling      Shoulder Imaging    X-rays of the right shoulder were reviewed, which demonstrate mild AC joint osteoarthritis with no other osseous abnormalities  I have reviewed the radiology report and do not currently have a radiology reading from [address] [place] [address], but will check the result once the reading is performed

## 2019-10-16 ENCOUNTER — HOSPITAL ENCOUNTER (OUTPATIENT)
Dept: MRI IMAGING | Facility: HOSPITAL | Age: 64
Discharge: HOME | End: 2019-10-16
Payer: OTHER MISCELLANEOUS

## 2019-10-16 DIAGNOSIS — S46.211A RUPTURE OF RIGHT PROXIMAL BICEPS TENDON, INITIAL ENCOUNTER: ICD-10-CM

## 2019-10-16 PROCEDURE — 73221 MRI JOINT UPR EXTREM W/O DYE: CPT

## 2019-10-28 ENCOUNTER — OFFICE VISIT (OUTPATIENT)
Dept: OBGYN CLINIC | Facility: MEDICAL CENTER | Age: 64
End: 2019-10-28
Payer: OTHER MISCELLANEOUS

## 2019-10-28 VITALS
HEIGHT: 69 IN | SYSTOLIC BLOOD PRESSURE: 126 MMHG | WEIGHT: 222 LBS | HEART RATE: 153 BPM | BODY MASS INDEX: 32.88 KG/M2 | DIASTOLIC BLOOD PRESSURE: 73 MMHG

## 2019-10-28 DIAGNOSIS — S46.211D RUPTURE OF RIGHT PROXIMAL BICEPS TENDON, SUBSEQUENT ENCOUNTER: Primary | ICD-10-CM

## 2019-10-28 PROCEDURE — 99213 OFFICE O/P EST LOW 20 MIN: CPT | Performed by: ORTHOPAEDIC SURGERY

## 2019-10-28 NOTE — PROGRESS NOTES
Ortho Sports Medicine Shoulder Follow Up Visit     Assesment:   71year old Male Right shoulder proximal bicep rupture, DOI: 10/5/19    Plan:    Conservative treatment:    Ice to shoulder 1-2 times daily, for 20 minutes at a time  PT script written   Let pain guide return to activities  Imaging: All imaging from today was reviewed by myself and explained to the patient  Injection:    No Injection planned at this time  Surgery: We did discuss surgical and nonsurgical options for the proximal biceps rupture  I discussed with him that shoulder and elbow function are not significantly affected by this injury and he certainly can consider living with it as is with formal physical therapy  We also discussed that he can get biceps cramping and he would likely have a visual cosmetic deformity of the biceps  Follow up:    Return in about 3 months (around 1/28/2020), or if symptoms worsen or fail to improve, for Recheck  He would like to wait after the busy winter season to decide if he would like surgery  Chief Complaint   Patient presents with    Right Shoulder - Follow-up         History of Present Illness: The patient is returns for follow up of review of MRI of the Right shoulder  Since the prior visit, He reports minimal improvement  He stated that he still is experiencing cramping in the proximal bicep and pain with overhead motion and internal rotation  He stated that 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 but 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 because he doesn't trust he has the strength  Pain is improved by rest, ice and NSAIDS  Pain is aggravated by overhead activity and lifting   Symptoms include cramping  The patient denies weakness  The patient has tried rest, ice and NSAIDS            I have reviewed the past medical, surgical, social and family history, medications and allergies as documented in the EMR  Review of systems: ROS is negative other than that noted in the HPI  Constitutional: Negative for fatigue and fever  Physical Exam:    Blood pressure 126/73, pulse (!) 153, height 5' 9" (1 753 m), weight 101 kg (222 lb)  General/Constitutional: NAD, well developed, well nourished  HENT: Normocephalic, atraumatic  CV: Intact distal pulses, regular rate  Resp: No respiratory distress or labored breathing  Lymphatic: No lymphadenopathy palpated  Neuro: Alert and Oriented x 3, no focal deficits  Psych: Normal mood, normal affect, normal judgement, normal behavior  Skin: Warm, dry, no rashes, no erythema      Shoulder focused exam:       RIGHT LEFT    Scapula Atrophy Negative Negative     Winging Negative Negative     Protraction Negative Negative    Rotator cuff SS 5/5 5/5     IS 5/5 5/5     SubS 5/5 5/5    ROM     170     ER0 60 60     ER90 90    90     IR90 T6    T6     IRb T6    T6    TTP: AC Negative Negative     Biceps Positive Negative     Coracoid Negative Negative    Special Tests: O'Briens Negative Negative     Jacobo-shear Negative Negative     Cross body Adduction Negative Negative     Speeds  Negative Negative     Tuan's Negative Negative     Whipple Negative Negative       Neer Negative Negative     Rowe Negative Negative    Instability: Apprehension & relocation not tested not tested     Load & shift not tested not tested    Other: Crank Negative Negative             Right Upper Extremity: visible Darius deformity     UE NV Exam: +2 Radial pulses bilaterally  Sensation intact to light touch C5-T1 bilaterally, Radial/median/ulnar nerve motor intact    Cervical ROM is full without pain, numbness or tingling      Shoulder Imaging    MRI of the Right shoulder was reviewed and demonstrated a proximal bicep rupture, small intrasubstance subscapularis tear  I have reviewed the radiologist report and agree with their impression

## 2019-10-31 ENCOUNTER — EVALUATION (OUTPATIENT)
Dept: PHYSICAL THERAPY | Facility: MEDICAL CENTER | Age: 64
End: 2019-10-31
Payer: OTHER MISCELLANEOUS

## 2019-10-31 DIAGNOSIS — S46.211D RUPTURE OF RIGHT PROXIMAL BICEPS TENDON, SUBSEQUENT ENCOUNTER: ICD-10-CM

## 2019-10-31 PROCEDURE — 97161 PT EVAL LOW COMPLEX 20 MIN: CPT | Performed by: PHYSICAL THERAPIST

## 2019-10-31 PROCEDURE — 97110 THERAPEUTIC EXERCISES: CPT | Performed by: PHYSICAL THERAPIST

## 2019-10-31 NOTE — PROGRESS NOTES
PT Evaluation and Discharge    Today's date: 10/31/2019  Patient name: Jaylin Saavedra  : 1955  MRN: [id number]  Referring provider: La Casey  Dx:   Encounter Diagnosis     ICD-10-CM    1  Rupture of right proximal biceps tendon, subsequent encounter S46 211D Ambulatory referral to Physical Therapy                  Assessment/Plan  Patient is a very pleasant 72 yo male who presents with slight discomfort and loss of supination strength of right arm following a proximal biceps tendon tear 2 weeks ago  Patient was found to have excellent strength and full ROM  Only mild cramping sensation of anterior deltoid with flexion and adduction  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 all of which he feels comfortable with  Patient is not climbing ladders out of safety concern  Patient was given HEP to assure consistent strength return and normal function  He will not be seen for follow up unless he is having an issue    Patient's only goal for treatment today was HEP which was reviewed and he was found to be independent with program        Flowsheet Rows      Most Recent Value   PT/OT G-Codes   Current Score  67   Projected Score  75             Precautions: none      Manual  10/31/2019                                                                                   Exercise Diary              Biceps flexion  20x            Supination  20x blue band            Biceps flexion supination  20x blue band            Shoulder ER 30            Shoulder IR 30

## 2020-02-03 ENCOUNTER — OFFICE VISIT (OUTPATIENT)
Dept: OBGYN CLINIC | Facility: MEDICAL CENTER | Age: 65
End: 2020-02-03
Payer: OTHER MISCELLANEOUS

## 2020-02-03 VITALS
WEIGHT: 228 LBS | DIASTOLIC BLOOD PRESSURE: 83 MMHG | HEIGHT: 69 IN | BODY MASS INDEX: 33.77 KG/M2 | SYSTOLIC BLOOD PRESSURE: 146 MMHG | HEART RATE: 59 BPM

## 2020-02-03 DIAGNOSIS — S46.211D RUPTURE OF RIGHT BICEPS TENDON, SUBSEQUENT ENCOUNTER: Primary | ICD-10-CM

## 2020-02-03 PROBLEM — S46.211A RUPTURE OF RIGHT BICEPS TENDON: Status: ACTIVE | Noted: 2020-02-03

## 2020-02-03 PROCEDURE — 99214 OFFICE O/P EST MOD 30 MIN: CPT | Performed by: ORTHOPAEDIC SURGERY

## 2020-02-03 RX ORDER — ACETAMINOPHEN 325 MG/1
650 TABLET ORAL EVERY 6 HOURS PRN
Status: CANCELLED | OUTPATIENT
Start: 2020-02-03

## 2020-02-03 RX ORDER — CEFAZOLIN SODIUM 2 G/50ML
2000 SOLUTION INTRAVENOUS ONCE
Status: CANCELLED | OUTPATIENT
Start: 2020-02-18 | End: 2020-02-03

## 2020-02-03 RX ORDER — ACETAMINOPHEN 325 MG/1
975 TABLET ORAL ONCE
Status: CANCELLED | OUTPATIENT
Start: 2020-02-03 | End: 2020-02-03

## 2020-02-03 RX ORDER — TRAMADOL HYDROCHLORIDE 50 MG/1
50 TABLET ORAL EVERY 6 HOURS SCHEDULED
Status: CANCELLED | OUTPATIENT
Start: 2020-02-03

## 2020-02-03 NOTE — PROGRESS NOTES
Ortho Sports Medicine Shoulder Visit     Assesment:   right shoulder proximal bicep rupture 10/5/19     Plan:    Conservative treatment:    Ice to shoulder 1-2 times daily, for 20 minutes at a time  Maintain daily activities until surgery  Therapy ordered post op  Sling will be given day of surgery  Imaging:    No imaging was available for review today  Injection:    No Injection planned at this time  Surgery: All of the risks and benefits of operative treatment were explained to the patient, as well as the risks and benefits of any alternative treatment options, including nonoperative care  This risks of surgery include, but are not limited to, infection, bleeding, blood clot, damage to nerves/arteries, need for further surgyer, cardiovascular risk, anesthesia risk, and continued pain  The patient understood this and elects to proceed forward with surgical intervention  We will proceed forward with surgical arthroscopy of the right shoulder with arthroscopic possible rotator cuff debridement vs repair with subacromial decompression and open subpectoral tenodesis  I did explain to the patient that because this injury is now more chronic in nature there could be significant scarring and tendon retraction and may be difficult or impossible to repair the biceps tendon and perform a tenodesis  I do feel this is very likely to be able to be done but did discuss the possibility and more challenging nature of the surgery with hip being done this far out from the injury  He did want to consider nonoperative care which is why we delayed treatment to this time frame, but his symptoms continue to be bothersome  I spent over 25 minutes in this face to face encounter, with over 25 minutes spent in counseling and coordinating care    History of Present Illness: The patient is returns for follow up of right proximal bicep rupture 10/5/19  Since the prior visit, He reports no improvement       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, pulling and carrying oil hoses  Feels it impairs his function  He continues to note daily anterior shoulder pain, worse with lifting, reaching arm backwards  Feels the bicep rupture tugging/pulling with certain motions  He has attended a session of therapy, maintained HEP  Pain is improved by rest, ice and NSAIDS  Pain is aggravated by reaching back, lifting  and exercising  Symptoms include popping  The patient has weakness with supination of forearm, holding and grasping items and climbing ladders  The patient has tried rest, ice, NSAIDS and physical therapy  I have reviewed the past medical, surgical, social and family history, medications and allergies as documented in the EMR  Review of systems: ROS is negative other than that noted in the HPI  Constitutional: Negative for fatigue and fever  Physical Exam:    Blood pressure 146/83, pulse 59, height 5' 9" (1 753 m), weight 103 kg (228 lb)  General/Constitutional: NAD, well developed, well nourished  HENT: Normocephalic, atraumatic  CV: Intact distal pulses, regular rate  Resp: No respiratory distress or labored breathing  Lymphatic: No lymphadenopathy palpated  Neuro: Alert and Oriented x 3, no focal deficits  Psych: Normal mood, normal affect, normal judgement, normal behavior  Skin: Warm, dry, no rashes, no erythema     Shoulder Exam (focused):     Shoulder focused exam:       RIGHT LEFT    Scapula Atrophy Positive Positive     Winging Negative Negative     Protraction Negative Negative    Rotator cuff SS 5/5 5/5     IS 5/5 5/5     SubS 5/5 5/5    ROM  170     ER0 60 60     ER90 90 90     IR90 40 40     IRb T6 T6    TTP: AC Negative Negative     Biceps Positive Negative     Coracoid Negative Negative    Special Tests: O'Briens Negative Negative     Jacobo-shear Negative Negative     Cross body Adduction Negative Negative     Speeds  Negative Negative     Tuan's Negative Negative     Whipple Negative Negative       Neer Negative Negative     Rowe Negative Negative    Instability: Apprehension & relocation not tested not tested     Load & shift not tested not tested    Other: Crank Negative Negative               UE NV Exam: +2 Radial pulses bilaterally  Sensation intact to light touch C5-T1 bilaterally, Radial/median/ulnar nerve motor intact    Cervical ROM is full without pain, numbness or tingling      Shoulder Imaging    No imaging was performed today        Scribe Attestation    I,:   Hugo Melton am acting as a scribe while in the presence of the attending physician :        I,:   46 Rosita Red DO personally performed the services described in this documentation    as scribed in my presence :

## 2020-02-03 NOTE — LETTER
February 3, 2020     Patient: Saleem Camarillo   YOB: 1955   Date of Visit: 2/3/2020       To Whom it May Concern:    Ira Westbrook is under my professional care  He was seen in my office on 2/3/2020  He can resume normal work duties until surgical date  If you have any questions or concerns, please don't hesitate to call           Sincerely,          Katelynn Gonzalez DO        CC: No Recipients

## 2020-02-04 ENCOUNTER — TELEPHONE (OUTPATIENT)
Dept: OBGYN CLINIC | Facility: MEDICAL CENTER | Age: 65
End: 2020-02-04

## 2020-02-04 NOTE — TELEPHONE ENCOUNTER
Patient called to state timing for surgery on his right shoulder is not optimum now  Will reschedule for end of May

## 2020-02-05 ENCOUNTER — CLINICAL SUPPORT (OUTPATIENT)
Dept: URGENT CARE | Age: 65
End: 2020-02-05
Payer: OTHER MISCELLANEOUS

## 2020-02-05 ENCOUNTER — TELEPHONE (OUTPATIENT)
Dept: OBGYN CLINIC | Facility: HOSPITAL | Age: 65
End: 2020-02-05

## 2020-02-05 DIAGNOSIS — Z01.818 ENCOUNTER FOR PREADMISSION TESTING: ICD-10-CM

## 2020-02-05 LAB
ATRIAL RATE: 59 BPM
P AXIS: 6 DEGREES
PR INTERVAL: 156 MS
QRS AXIS: 32 DEGREES
QRSD INTERVAL: 90 MS
QT INTERVAL: 436 MS
QTC INTERVAL: 431 MS
T WAVE AXIS: 65 DEGREES
VENTRICULAR RATE: 59 BPM

## 2020-02-05 PROCEDURE — 93010 ELECTROCARDIOGRAM REPORT: CPT | Performed by: INTERNAL MEDICINE

## 2020-02-05 PROCEDURE — 93005 ELECTROCARDIOGRAM TRACING: CPT

## 2020-02-05 NOTE — TELEPHONE ENCOUNTER
I spoke to the patient and answered all questions  He will make another office appointment to reschedule his surgery and answer any other questions

## 2020-02-05 NOTE — TELEPHONE ENCOUNTER
Patient of DR Kika Sapp    Patient called asking to speak to clinical team about recovery time for after surgery   Patient also has questions regarding the duration of PO therapy       Phone # [phone]

## 2020-02-07 ENCOUNTER — APPOINTMENT (OUTPATIENT)
Dept: LAB | Age: 65
End: 2020-02-07
Payer: OTHER MISCELLANEOUS

## 2020-02-07 DIAGNOSIS — Z01.818 ENCOUNTER FOR PREADMISSION TESTING: ICD-10-CM

## 2020-02-07 LAB
ANION GAP SERPL CALCULATED.3IONS-SCNC: 4 MMOL/L (ref 4–13)
BASOPHILS # BLD AUTO: 0.07 THOUSANDS/ΜL (ref 0–0.1)
BASOPHILS NFR BLD AUTO: 1 % (ref 0–1)
BUN SERPL-MCNC: 16 MG/DL (ref 5–25)
CALCIUM SERPL-MCNC: 9.3 MG/DL (ref 8.3–10.1)
CHLORIDE SERPL-SCNC: 105 MMOL/L (ref 100–108)
CO2 SERPL-SCNC: 29 MMOL/L (ref 21–32)
CREAT SERPL-MCNC: 1.13 MG/DL (ref 0.6–1.3)
EOSINOPHIL # BLD AUTO: 0.12 THOUSAND/ΜL (ref 0–0.61)
EOSINOPHIL NFR BLD AUTO: 2 % (ref 0–6)
ERYTHROCYTE [DISTWIDTH] IN BLOOD BY AUTOMATED COUNT: 13.2 % (ref 11.6–15.1)
GFR SERPL CREATININE-BSD FRML MDRD: 67 ML/MIN/1.73SQ M
GLUCOSE SERPL-MCNC: 97 MG/DL (ref 65–140)
HCT VFR BLD AUTO: 46.4 % (ref 36.5–49.3)
HGB BLD-MCNC: 15.4 G/DL (ref 12–17)
IMM GRANULOCYTES # BLD AUTO: 0.01 THOUSAND/UL (ref 0–0.2)
IMM GRANULOCYTES NFR BLD AUTO: 0 % (ref 0–2)
LYMPHOCYTES # BLD AUTO: 1.77 THOUSANDS/ΜL (ref 0.6–4.47)
LYMPHOCYTES NFR BLD AUTO: 29 % (ref 14–44)
MCH RBC QN AUTO: 28 PG (ref 26.8–34.3)
MCHC RBC AUTO-ENTMCNC: 33.2 G/DL (ref 31.4–37.4)
MCV RBC AUTO: 84 FL (ref 82–98)
MONOCYTES # BLD AUTO: 0.48 THOUSAND/ΜL (ref 0.17–1.22)
MONOCYTES NFR BLD AUTO: 8 % (ref 4–12)
NEUTROPHILS # BLD AUTO: 3.57 THOUSANDS/ΜL (ref 1.85–7.62)
NEUTS SEG NFR BLD AUTO: 60 % (ref 43–75)
NRBC BLD AUTO-RTO: 0 /100 WBCS
PLATELET # BLD AUTO: 207 THOUSANDS/UL (ref 149–390)
PMV BLD AUTO: 9.7 FL (ref 8.9–12.7)
POTASSIUM SERPL-SCNC: 3.9 MMOL/L (ref 3.5–5.3)
RBC # BLD AUTO: 5.5 MILLION/UL (ref 3.88–5.62)
SODIUM SERPL-SCNC: 138 MMOL/L (ref 136–145)
WBC # BLD AUTO: 6.02 THOUSAND/UL (ref 4.31–10.16)

## 2020-02-07 PROCEDURE — 85025 COMPLETE CBC W/AUTO DIFF WBC: CPT

## 2020-02-07 PROCEDURE — 80048 BASIC METABOLIC PNL TOTAL CA: CPT

## 2020-02-07 PROCEDURE — 36415 COLL VENOUS BLD VENIPUNCTURE: CPT

## 2020-02-13 ENCOUNTER — TELEPHONE (OUTPATIENT)
Dept: OBGYN CLINIC | Facility: HOSPITAL | Age: 65
End: 2020-02-13

## 2020-02-13 NOTE — TELEPHONE ENCOUNTER
Patient sees Dr Luis Anthony  Patient is scheduled for surgery on 2/18  He is calling in stating when he turns him palm up and down like he is turning a screw  will that cramping go away? He is also asking if he is going to get back his full motion in arm and his shoulder? Since it has been four months and if they open up and there is too much scar tissue what would happen at that time? Patient is asking for a call back relating this     Call back# [phone]

## 2020-02-13 NOTE — TELEPHONE ENCOUNTER
Please schedule the patient for formal office with me on Friday or Monday so we can discuss these questions in full detail    Thank you

## 2020-02-16 ENCOUNTER — ANESTHESIA EVENT (OUTPATIENT)
Dept: PERIOP | Facility: HOSPITAL | Age: 65
End: 2020-02-16
Payer: OTHER MISCELLANEOUS

## 2020-02-17 ENCOUNTER — OFFICE VISIT (OUTPATIENT)
Dept: OBGYN CLINIC | Facility: MEDICAL CENTER | Age: 65
End: 2020-02-17
Payer: OTHER MISCELLANEOUS

## 2020-02-17 VITALS
DIASTOLIC BLOOD PRESSURE: 83 MMHG | HEART RATE: 77 BPM | HEIGHT: 69 IN | SYSTOLIC BLOOD PRESSURE: 145 MMHG | WEIGHT: 225 LBS | BODY MASS INDEX: 33.33 KG/M2

## 2020-02-17 DIAGNOSIS — S46.211D RUPTURE OF RIGHT BICEPS TENDON, SUBSEQUENT ENCOUNTER: Primary | ICD-10-CM

## 2020-02-17 PROCEDURE — 99213 OFFICE O/P EST LOW 20 MIN: CPT | Performed by: ORTHOPAEDIC SURGERY

## 2020-02-17 NOTE — H&P (VIEW-ONLY)
Ortho Sports Medicine Shoulder Follow Up Visit     Assesment:   76 year male with right shoulder proximal bicep rupture 10/5/19     Plan:    Conservative treatment:    Ice to shoulder 1-2 times daily, for 20 minutes at a time  Maintain daily activities until surgery  Therapy ordered post op  Sling will be given day of surgery  Imaging: All imaging from today was reviewed by myself and explained to the patient  Injection:    No Injection planned at this time  Surgery: All of the risks and benefits of operative treatment were explained to the patient, as well as the risks and benefits of any alternative treatment options, including nonoperative care  This risks of surgery include, but are not limited to, infection, bleeding, blood clot, damage to nerves/arteries, need for further surgyer, cardiovascular risk, anesthesia risk, and continued pain  The patient understood this and elects to proceed forward with surgical intervention  We will proceed forward with surgical arthroscopy of the right shoulder with arthroscopic possible rotator cuff debridement vs repair with subacromial decompression and open subpectoral tenodesis  I did explain to the patient that because this injury is now more chronic in nature there could be significant scarring and tendon retraction and may be difficult or impossible to repair the biceps tendon and perform a tenodesis  I do feel this is very likely to be able to be done but did discuss the possibility and more challenging nature of the surgery with hip being done this far out from the injury  He did want to consider nonoperative care which is why we delayed treatment to this time frame, but his symptoms continue to be bothersome  Follow up:    Return 1 week post op  Chief Complaint   Patient presents with    Right Shoulder - Follow-up       History of Present Illness: The patient is returns for follow up of right proximal bicep rupture 10/5/19  Since the prior visit, He reports no improvement  He is here for a follow up to discuss some preoperative questions before his scheduled surgery tomorrow 2/18/2020  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, pulling and carrying oil hoses  Feels it impairs his function  He continues to note daily anterior shoulder pain, worse with lifting, reaching arm backwards  Feels the bicep rupture tugging/pulling with certain motions       The patient states that he feels some occasional cramping after using his shoulder for a few hours  He has attended a session of therapy, maintained HEP     Pain is improved by rest, ice and NSAIDS  Pain is aggravated by reaching back, lifting  and exercising  Symptoms include popping  The patient has weakness with supination of forearm, holding and grasping items and climbing ladders  The patient has tried rest, ice, NSAIDS and physical therapy  I have reviewed the past medical, surgical, social and family history, medications and allergies as documented in the EMR  Review of systems: ROS is negative other than that noted in the HPI  Constitutional: Negative for fatigue and fever  Physical Exam:    Blood pressure 145/83, pulse 77, height 5' 9" (1 753 m), weight 102 kg (225 lb)      General/Constitutional: NAD, well developed, well nourished  HENT: Normocephalic, atraumatic  CV: Intact distal pulses, regular rate  Resp: No respiratory distress or labored breathing  Lymphatic: No lymphadenopathy palpated  Neuro: Alert and Oriented x 3, no focal deficits  Psych: Normal mood, normal affect, normal judgement, normal behavior  Skin: Warm, dry, no rashes, no erythema      Shoulder focused exam:       RIGHT LEFT    Scapula Atrophy Negative Negative     Winging Negative Negative     Protraction Negative Negative    Rotator cuff SS 5/5 5/5     IS 5/5 5/5     SubS 5/5 5/5    ROM     170     ER0 60 60     ER90 90    90     IR90 40    40     IRb T6    T6    TTP: AC Negative Negative     Biceps Positive Negative     Coracoid Negative Negative    Special Tests: O'Briens Negative Negative     Jacobo-shear Negative Negative     Cross body Adduction Negative Negative     Speeds  Negative Negative     Tuan's Negative Negative     Whipple Negative Negative       Neer Negative Negative     Rowe Negative Negative    Instability: Apprehension & relocation not tested not tested     Load & shift not tested not tested    Other: Crank Negative Negative             UE NV Exam: +2 Radial pulses bilaterally  Sensation intact to light touch C5-T1 bilaterally, Radial/median/ulnar nerve motor intact    Cervical ROM is full without pain, numbness or tingling      Shoulder Imaging    No imaging was performed today    Scribe Attestation    I,:   Cherie Graham am acting as a scribe while in the presence of the attending physician :        I,:   Lawrence Red DO personally performed the services described in this documentation    as scribed in my presence :

## 2020-02-17 NOTE — PROGRESS NOTES
Ortho Sports Medicine Shoulder Follow Up Visit     Assesment:   76 year male with right shoulder proximal bicep rupture 10/5/19     Plan:    Conservative treatment:    Ice to shoulder 1-2 times daily, for 20 minutes at a time  Maintain daily activities until surgery  Therapy ordered post op  Sling will be given day of surgery  Imaging: All imaging from today was reviewed by myself and explained to the patient  Injection:    No Injection planned at this time  Surgery: All of the risks and benefits of operative treatment were explained to the patient, as well as the risks and benefits of any alternative treatment options, including nonoperative care  This risks of surgery include, but are not limited to, infection, bleeding, blood clot, damage to nerves/arteries, need for further surgyer, cardiovascular risk, anesthesia risk, and continued pain  The patient understood this and elects to proceed forward with surgical intervention  We will proceed forward with surgical arthroscopy of the right shoulder with arthroscopic possible rotator cuff debridement vs repair with subacromial decompression and open subpectoral tenodesis  I did explain to the patient that because this injury is now more chronic in nature there could be significant scarring and tendon retraction and may be difficult or impossible to repair the biceps tendon and perform a tenodesis  I do feel this is very likely to be able to be done but did discuss the possibility and more challenging nature of the surgery with hip being done this far out from the injury  He did want to consider nonoperative care which is why we delayed treatment to this time frame, but his symptoms continue to be bothersome  Follow up:    Return 1 week post op  Chief Complaint   Patient presents with    Right Shoulder - Follow-up       History of Present Illness: The patient is returns for follow up of right proximal bicep rupture 10/5/19  Since the prior visit, He reports no improvement  He is here for a follow up to discuss some preoperative questions before his scheduled surgery tomorrow 2/18/2020  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, pulling and carrying oil hoses  Feels it impairs his function  He continues to note daily anterior shoulder pain, worse with lifting, reaching arm backwards  Feels the bicep rupture tugging/pulling with certain motions       The patient states that he feels some occasional cramping after using his shoulder for a few hours  He has attended a session of therapy, maintained HEP     Pain is improved by rest, ice and NSAIDS  Pain is aggravated by reaching back, lifting  and exercising  Symptoms include popping  The patient has weakness with supination of forearm, holding and grasping items and climbing ladders  The patient has tried rest, ice, NSAIDS and physical therapy  I have reviewed the past medical, surgical, social and family history, medications and allergies as documented in the EMR  Review of systems: ROS is negative other than that noted in the HPI  Constitutional: Negative for fatigue and fever  Physical Exam:    Blood pressure 145/83, pulse 77, height 5' 9" (1 753 m), weight 102 kg (225 lb)      General/Constitutional: NAD, well developed, well nourished  HENT: Normocephalic, atraumatic  CV: Intact distal pulses, regular rate  Resp: No respiratory distress or labored breathing  Lymphatic: No lymphadenopathy palpated  Neuro: Alert and Oriented x 3, no focal deficits  Psych: Normal mood, normal affect, normal judgement, normal behavior  Skin: Warm, dry, no rashes, no erythema      Shoulder focused exam:       RIGHT LEFT    Scapula Atrophy Negative Negative     Winging Negative Negative     Protraction Negative Negative    Rotator cuff SS 5/5 5/5     IS 5/5 5/5     SubS 5/5 5/5    ROM     170     ER0 60 60     ER90 90    90     IR90 40    40     IRb T6    T6    TTP: AC Negative Negative     Biceps Positive Negative     Coracoid Negative Negative    Special Tests: O'Briens Negative Negative     Jacobo-shear Negative Negative     Cross body Adduction Negative Negative     Speeds  Negative Negative     Tuan's Negative Negative     Whipple Negative Negative       Neer Negative Negative     Rowe Negative Negative    Instability: Apprehension & relocation not tested not tested     Load & shift not tested not tested    Other: Crank Negative Negative             UE NV Exam: +2 Radial pulses bilaterally  Sensation intact to light touch C5-T1 bilaterally, Radial/median/ulnar nerve motor intact    Cervical ROM is full without pain, numbness or tingling      Shoulder Imaging    No imaging was performed today    Scribe Attestation    I,:   Miguel Rodríguez am acting as a scribe while in the presence of the attending physician :        I,:   Lawrence Red DO personally performed the services described in this documentation    as scribed in my presence :

## 2020-02-17 NOTE — ANESTHESIA PREPROCEDURE EVALUATION
Review of Systems/Medical History      No history of anesthetic complications     Cardiovascular  Negative cardio ROS Exercise tolerance (METS): >4,  Hyperlipidemia, Hypertension controlled,    Pulmonary  Negative pulmonary ROS        GI/Hepatic  Negative GI/hepatic ROS          Negative  ROS Kidney stones, Chronic kidney disease stage 2,        Endo/Other  Negative endo/other ROS   Obesity  morbid obesity   GYN       Hematology  Negative hematology ROS      Musculoskeletal  Negative musculoskeletal ROS   Arthritis     Neurology  Negative neurology ROS      Psychology   Negative psychology ROS Anxiety,   Chronic pain,            Physical Exam    Airway    Mallampati score: II  TM Distance: >3 FB  Neck ROM: full     Dental       Cardiovascular  Comment: Negative ROS, Cardiovascular exam normal    Pulmonary  Pulmonary exam normal     Other Findings        Anesthesia Plan  ASA Score- 3     Anesthesia Type- general and regional with ASA Monitors  Additional Monitors:   Airway Plan: ETT  Plan Factors-Patient not instructed to abstain from smoking on day of procedure  Patient did not smoke on day of surgery  Induction- intravenous  Postoperative Plan- Plan for postoperative opioid use  Informed Consent- Anesthetic plan and risks discussed with patient  I personally reviewed this patient with the CRNA  Discussed and agreed on the Anesthesia Plan with the CRNA  Damon Willard             No results found for: HGBA1C    Lab Results   Component Value Date    K 3 9 02/07/2020     02/07/2020    CO2 29 02/07/2020    BUN 16 02/07/2020    CREATININE 1 13 02/07/2020    CALCIUM 9 3 02/07/2020    AST 18 09/29/2018    ALT 27 09/29/2018    ALKPHOS 57 09/29/2018    EGFR 67 02/07/2020       Lab Results   Component Value Date    WBC 6 02 02/07/2020    HGB 15 4 02/07/2020    HCT 46 4 02/07/2020    MCV 84 02/07/2020     02/07/2020   Sinus bradycardia  Otherwise normal ECG  When compared with ECG of 17-FEB-2004 20:42,  No significant change was found  Confirmed by Eb Guallpa ([id number]) on 2/5/2020 11:33:53 PM

## 2020-02-18 ENCOUNTER — ANESTHESIA (OUTPATIENT)
Dept: PERIOP | Facility: HOSPITAL | Age: 65
End: 2020-02-18
Payer: OTHER MISCELLANEOUS

## 2020-02-18 ENCOUNTER — HOSPITAL ENCOUNTER (OUTPATIENT)
Facility: HOSPITAL | Age: 65
Setting detail: OUTPATIENT SURGERY
Discharge: HOME | End: 2020-02-18
Attending: ORTHOPAEDIC SURGERY | Admitting: ORTHOPAEDIC SURGERY
Payer: OTHER MISCELLANEOUS

## 2020-02-18 VITALS
DIASTOLIC BLOOD PRESSURE: 70 MMHG | RESPIRATION RATE: 16 BRPM | BODY MASS INDEX: 33.18 KG/M2 | HEART RATE: 60 BPM | TEMPERATURE: 97.2 F | HEIGHT: 69 IN | SYSTOLIC BLOOD PRESSURE: 120 MMHG | OXYGEN SATURATION: 97 % | WEIGHT: 224 LBS

## 2020-02-18 DIAGNOSIS — S46.211D RUPTURE OF RIGHT PROXIMAL BICEPS TENDON, SUBSEQUENT ENCOUNTER: Primary | ICD-10-CM

## 2020-02-18 PROCEDURE — C1713 ANCHOR/SCREW BN/BN,TIS/BN: HCPCS | Performed by: ORTHOPAEDIC SURGERY

## 2020-02-18 PROCEDURE — 29822 SHO ARTHRS SRG LMTD DBRDMT: CPT | Performed by: ORTHOPAEDIC SURGERY

## 2020-02-18 PROCEDURE — 23430 REPAIR BICEPS TENDON: CPT | Performed by: PHYSICIAN ASSISTANT

## 2020-02-18 PROCEDURE — 29822 SHO ARTHRS SRG LMTD DBRDMT: CPT | Performed by: PHYSICIAN ASSISTANT

## 2020-02-18 PROCEDURE — 23430 REPAIR BICEPS TENDON: CPT | Performed by: ORTHOPAEDIC SURGERY

## 2020-02-18 PROCEDURE — NC001 PR NO CHARGE: Performed by: PHYSICIAN ASSISTANT

## 2020-02-18 DEVICE — IMPLANTABLE DEVICE: Type: IMPLANTABLE DEVICE | Site: ARM | Status: FUNCTIONAL

## 2020-02-18 DEVICE — IMPLANTABLE DEVICE: Type: IMPLANTABLE DEVICE | Site: SHOULDER | Status: FUNCTIONAL

## 2020-02-18 RX ORDER — MAGNESIUM HYDROXIDE 1200 MG/15ML
LIQUID ORAL AS NEEDED
Status: DISCONTINUED | OUTPATIENT
Start: 2020-02-18 | End: 2020-02-18 | Stop reason: HOSPADM

## 2020-02-18 RX ORDER — ONDANSETRON 2 MG/ML
4 INJECTION INTRAMUSCULAR; INTRAVENOUS ONCE AS NEEDED
Status: DISCONTINUED | OUTPATIENT
Start: 2020-02-18 | End: 2020-02-18 | Stop reason: HOSPADM

## 2020-02-18 RX ORDER — OXYCODONE HYDROCHLORIDE 5 MG/1
5 TABLET ORAL EVERY 4 HOURS PRN
Qty: 15 TABLET | Refills: 0 | Status: SHIPPED | OUTPATIENT
Start: 2020-02-18 | End: 2020-05-26 | Stop reason: ALTCHOICE

## 2020-02-18 RX ORDER — FENTANYL CITRATE 50 UG/ML
INJECTION, SOLUTION INTRAMUSCULAR; INTRAVENOUS
Status: COMPLETED | OUTPATIENT
Start: 2020-02-18 | End: 2020-02-18

## 2020-02-18 RX ORDER — ACETAMINOPHEN 325 MG/1
650 TABLET ORAL EVERY 6 HOURS PRN
Status: DISCONTINUED | OUTPATIENT
Start: 2020-02-18 | End: 2020-02-18 | Stop reason: HOSPADM

## 2020-02-18 RX ORDER — ROCURONIUM BROMIDE 10 MG/ML
INJECTION, SOLUTION INTRAVENOUS AS NEEDED
Status: DISCONTINUED | OUTPATIENT
Start: 2020-02-18 | End: 2020-02-18 | Stop reason: SURG

## 2020-02-18 RX ORDER — ACETAMINOPHEN 325 MG/1
975 TABLET ORAL ONCE
Status: COMPLETED | OUTPATIENT
Start: 2020-02-18 | End: 2020-02-18

## 2020-02-18 RX ORDER — PROPOFOL 10 MG/ML
INJECTION, EMULSION INTRAVENOUS AS NEEDED
Status: DISCONTINUED | OUTPATIENT
Start: 2020-02-18 | End: 2020-02-18 | Stop reason: SURG

## 2020-02-18 RX ORDER — DEXAMETHASONE SODIUM PHOSPHATE 10 MG/ML
INJECTION, SOLUTION INTRAMUSCULAR; INTRAVENOUS AS NEEDED
Status: DISCONTINUED | OUTPATIENT
Start: 2020-02-18 | End: 2020-02-18 | Stop reason: SURG

## 2020-02-18 RX ORDER — ROPIVACAINE HYDROCHLORIDE 5 MG/ML
INJECTION, SOLUTION EPIDURAL; INFILTRATION; PERINEURAL
Status: COMPLETED | OUTPATIENT
Start: 2020-02-18 | End: 2020-02-18

## 2020-02-18 RX ORDER — LIDOCAINE HYDROCHLORIDE 10 MG/ML
INJECTION, SOLUTION EPIDURAL; INFILTRATION; INTRACAUDAL; PERINEURAL AS NEEDED
Status: DISCONTINUED | OUTPATIENT
Start: 2020-02-18 | End: 2020-02-18 | Stop reason: SURG

## 2020-02-18 RX ORDER — HYDROMORPHONE HCL/PF 1 MG/ML
0.5 SYRINGE (ML) INJECTION
Status: DISCONTINUED | OUTPATIENT
Start: 2020-02-18 | End: 2020-02-18 | Stop reason: HOSPADM

## 2020-02-18 RX ORDER — ONDANSETRON 2 MG/ML
INJECTION INTRAMUSCULAR; INTRAVENOUS AS NEEDED
Status: DISCONTINUED | OUTPATIENT
Start: 2020-02-18 | End: 2020-02-18 | Stop reason: SURG

## 2020-02-18 RX ORDER — CEFAZOLIN SODIUM 2 G/50ML
2000 SOLUTION INTRAVENOUS ONCE
Status: COMPLETED | OUTPATIENT
Start: 2020-02-18 | End: 2020-02-18

## 2020-02-18 RX ORDER — FENTANYL CITRATE 50 UG/ML
INJECTION, SOLUTION INTRAMUSCULAR; INTRAVENOUS AS NEEDED
Status: DISCONTINUED | OUTPATIENT
Start: 2020-02-18 | End: 2020-02-18 | Stop reason: SURG

## 2020-02-18 RX ORDER — EPHEDRINE SULFATE 50 MG/ML
INJECTION INTRAVENOUS AS NEEDED
Status: DISCONTINUED | OUTPATIENT
Start: 2020-02-18 | End: 2020-02-18 | Stop reason: SURG

## 2020-02-18 RX ORDER — SODIUM CHLORIDE, SODIUM LACTATE, POTASSIUM CHLORIDE, CALCIUM CHLORIDE 600; 310; 30; 20 MG/100ML; MG/100ML; MG/100ML; MG/100ML
50 INJECTION, SOLUTION INTRAVENOUS CONTINUOUS
Status: DISCONTINUED | OUTPATIENT
Start: 2020-02-18 | End: 2020-02-18 | Stop reason: HOSPADM

## 2020-02-18 RX ORDER — GLYCOPYRROLATE 0.2 MG/ML
INJECTION INTRAMUSCULAR; INTRAVENOUS AS NEEDED
Status: DISCONTINUED | OUTPATIENT
Start: 2020-02-18 | End: 2020-02-18 | Stop reason: SURG

## 2020-02-18 RX ORDER — MIDAZOLAM HYDROCHLORIDE 2 MG/2ML
INJECTION, SOLUTION INTRAMUSCULAR; INTRAVENOUS
Status: COMPLETED | OUTPATIENT
Start: 2020-02-18 | End: 2020-02-18

## 2020-02-18 RX ORDER — TRAMADOL HYDROCHLORIDE 50 MG/1
50 TABLET ORAL EVERY 6 HOURS SCHEDULED
Status: DISCONTINUED | OUTPATIENT
Start: 2020-02-18 | End: 2020-02-18 | Stop reason: HOSPADM

## 2020-02-18 RX ORDER — NEOSTIGMINE METHYLSULFATE 1 MG/ML
INJECTION INTRAVENOUS AS NEEDED
Status: DISCONTINUED | OUTPATIENT
Start: 2020-02-18 | End: 2020-02-18 | Stop reason: SURG

## 2020-02-18 RX ADMIN — SODIUM CHLORIDE, SODIUM LACTATE, POTASSIUM CHLORIDE, AND CALCIUM CHLORIDE: .6; .31; .03; .02 INJECTION, SOLUTION INTRAVENOUS at 08:01

## 2020-02-18 RX ADMIN — SODIUM CHLORIDE, SODIUM LACTATE, POTASSIUM CHLORIDE, AND CALCIUM CHLORIDE: .6; .31; .03; .02 INJECTION, SOLUTION INTRAVENOUS at 09:18

## 2020-02-18 RX ADMIN — HYDROMORPHONE HYDROCHLORIDE 0.5 MG: 1 INJECTION, SOLUTION INTRAMUSCULAR; INTRAVENOUS; SUBCUTANEOUS at 10:20

## 2020-02-18 RX ADMIN — ROCURONIUM BROMIDE 50 MG: 10 INJECTION, SOLUTION INTRAVENOUS at 08:12

## 2020-02-18 RX ADMIN — ONDANSETRON HYDROCHLORIDE 4 MG: 2 INJECTION, SOLUTION INTRAMUSCULAR; INTRAVENOUS at 09:02

## 2020-02-18 RX ADMIN — GLYCOPYRROLATE 0.4 MG: 0.2 INJECTION, SOLUTION INTRAMUSCULAR; INTRAVENOUS at 09:27

## 2020-02-18 RX ADMIN — PROPOFOL 50 MG: 10 INJECTION, EMULSION INTRAVENOUS at 09:43

## 2020-02-18 RX ADMIN — MIDAZOLAM HYDROCHLORIDE 2 MG: 1 INJECTION, SOLUTION INTRAMUSCULAR; INTRAVENOUS at 07:50

## 2020-02-18 RX ADMIN — DEXAMETHASONE SODIUM PHOSPHATE 4 MG: 10 INJECTION, SOLUTION INTRAMUSCULAR; INTRAVENOUS at 08:31

## 2020-02-18 RX ADMIN — NEOSTIGMINE METHYLSULFATE 2 MG: 1 INJECTION INTRAVENOUS at 09:27

## 2020-02-18 RX ADMIN — GLYCOPYRROLATE 0.2 MG: 0.2 INJECTION, SOLUTION INTRAMUSCULAR; INTRAVENOUS at 08:19

## 2020-02-18 RX ADMIN — EPHEDRINE SULFATE 10 MG: 50 INJECTION, SOLUTION INTRAVENOUS at 08:58

## 2020-02-18 RX ADMIN — EPHEDRINE SULFATE 5 MG: 50 INJECTION, SOLUTION INTRAVENOUS at 08:43

## 2020-02-18 RX ADMIN — PHENYLEPHRINE HYDROCHLORIDE 100 MCG: 10 INJECTION INTRAVENOUS at 09:08

## 2020-02-18 RX ADMIN — ACETAMINOPHEN 975 MG: 325 TABLET ORAL at 07:08

## 2020-02-18 RX ADMIN — LIDOCAINE HYDROCHLORIDE 50 MG: 10 INJECTION, SOLUTION EPIDURAL; INFILTRATION; INTRACAUDAL; PERINEURAL at 08:12

## 2020-02-18 RX ADMIN — PROPOFOL 200 MG: 10 INJECTION, EMULSION INTRAVENOUS at 08:12

## 2020-02-18 RX ADMIN — FENTANYL CITRATE 25 MCG: 50 INJECTION INTRAMUSCULAR; INTRAVENOUS at 08:49

## 2020-02-18 RX ADMIN — ROPIVACAINE HYDROCHLORIDE 20 ML: 5 INJECTION, SOLUTION EPIDURAL; INFILTRATION; PERINEURAL at 07:50

## 2020-02-18 RX ADMIN — FENTANYL CITRATE 50 MCG: 50 INJECTION INTRAMUSCULAR; INTRAVENOUS at 07:50

## 2020-02-18 RX ADMIN — PHENYLEPHRINE HYDROCHLORIDE 100 MCG: 10 INJECTION INTRAVENOUS at 08:26

## 2020-02-18 RX ADMIN — PHENYLEPHRINE HYDROCHLORIDE 100 MCG: 10 INJECTION INTRAVENOUS at 08:43

## 2020-02-18 RX ADMIN — EPHEDRINE SULFATE 5 MG: 50 INJECTION, SOLUTION INTRAVENOUS at 09:03

## 2020-02-18 RX ADMIN — PHENYLEPHRINE HYDROCHLORIDE 100 MCG: 10 INJECTION INTRAVENOUS at 08:58

## 2020-02-18 RX ADMIN — CEFAZOLIN SODIUM 2000 MG: 2 SOLUTION INTRAVENOUS at 08:21

## 2020-02-18 RX ADMIN — FENTANYL CITRATE 25 MCG: 50 INJECTION INTRAMUSCULAR; INTRAVENOUS at 09:26

## 2020-02-18 RX ADMIN — EPHEDRINE SULFATE 10 MG: 50 INJECTION, SOLUTION INTRAVENOUS at 08:33

## 2020-02-18 NOTE — DISCHARGE INSTRUCTIONS
POSTOPERATIVE INSTRUCTIONS following SHOULDER SURGERY    MEDICATIONS:  · Resume all home medications unless otherwise instructed by your surgeon  · Pain Medication:  Oxycodone 5 mg 1 tab oral every 4-6 hours as needed for pain  · If you were given a regional anesthetic (nerve block), please begin taking the pain medication as soon as you get home, even if you have minimal or no pain  DO NOT WAIT FOR THE NERVE BLOCK TO WEAR OFF  · Possible side effects include nausea, constipation, and urinary retention  If you experience these side effects, please call our office for assistance  · Pain med refills are authorized only during office hours (8am-4pm, Mon-Fri)  · Anti-Inflammatory/Pain:   Advil 400 mg oral every 6 hours for 3-5 days for pain control and inflammation (over the counter) and Tylenol 650 mg oral every 6 hours for 3-5 days for pain control (over the counter)  · TAKE WITH FOOD  Stop if you experience nausea, reflux, or stomach pain  WOUND CARE:  · Keep the dressing clean and dry  Light drainage may occur the first 2 days postop  · You may remove the dressings and get the incision wet in the shower 4 days after surgery  DO NOT remove steri-strips or sutures  DO NOT immerse the incision under water  Carefully pat the incision dry  If there is wound drainage, re-apply a fresh dry gauze dressing  · Please call our office ([phone]) if you experience either of the following:  · Sudden increase in swelling, redness, or warmth at the surgical site  · Excessive incisional drainage that persists beyond the 3rd day after surgery  · Oral temperature greater than 101 degrees, not relieved with Tylenol  · Shortness of breath, chest pain, nausea, or any other concerning symptoms    SWELLING CONTROL:  · Cold Therapy:  Apply ice (20 min on, 20 min off) as often as you feel is necessary  Use ice every few hours during the first 3-4 days post operatively      SLING:  · Wear your sling AT ALL TIMES (including sleep) until your first postoperative office visit  You may remove the sling for showering but must keep your arm at your side  ACTIVITY:   · DO NOT lift, carry, push, or pull anything with your operative arm  · Shoulder:  DO NOT actively (on your own) raise your operative arm away from the side of you body or rotate it out away from your body unless instructed by your surgeon or physical therapist   · Place a pillow behind the elbow while lying down  · Sleeping in a more upright position (recliner) may be more comfortable initially  · Wrist / Finger Motion:  With the sling on, move your wrist and fingers through a full range of motion 20 times per hour while awake  PHYSICAL THERAPY:  · Begin therapy 5 TO 7 DAYS AFTER SURGERY  You were given a prescription for therapy at your preoperative office visit  If you do not have physical therapy scheduled yet, please call our office for assistance      FOLLOW-UP APPOINTMENT:  · 7-10 days after surgery with:    DO Isabela Hayes  [facility]  [phone]

## 2020-02-18 NOTE — ANESTHESIA PROCEDURE NOTES
Peripheral Block    Patient location during procedure: holding area  Start time: 2/18/2020 7:40 AM  Reason for block: at surgeon's request and post-op pain management  Staffing  Anesthesiologist: Aristides Patel MD  Preanesthetic Checklist  Completed: patient identified, site marked, surgical consent, pre-op evaluation, timeout performed, IV checked, risks and benefits discussed and monitors and equipment checked  Peripheral Block  Patient position: supine  Prep: ChloraPrep  Patient monitoring: heart rate, cardiac monitor, continuous pulse ox and frequent blood pressure checks  Block type: interscalene  Laterality: right  Injection technique: single-shot  Procedures: ultrasound guided, Ultrasound guidance required for the procedure to increase accuracy and safety of medication placement and decrease risk of complications   and nerve stimulatorropivacaine (NAROPIN) 0 5 % perineural infiltration, 20 mL  fentaNYL 50 mcg/mL IV, 50 mcg  midazolam (VERSED) 2 mg/2 mL IV, 2 mg  Needle  Needle type: Stimuplex   Needle gauge: 22 G  Needle localization: nerve stimulator  Test dose: negative  Assessment  Injection assessment: incremental injection, negative aspiration for CSF, negative aspiration for heme and no paresthesia on injection  Paresthesia pain: none  Heart rate change: no  Post-procedure:  site cleaned  patient tolerated the procedure well with no immediate complications

## 2020-02-18 NOTE — INTERVAL H&P NOTE
H&P reviewed  After examining the patient I find no changes in the patients condition since the H&P had been written      Vitals:    02/18/20 0642   BP: 149/72   Pulse: 58   Resp: 16   Temp: 97 8 °F (36 6 °C)   SpO2: 99%

## 2020-02-18 NOTE — ANESTHESIA POSTPROCEDURE EVALUATION
Post-Op Assessment Note    CV Status:  Stable    Pain management: adequate     Mental Status:  Alert and awake   Hydration Status:  Euvolemic   PONV Controlled:  Controlled   Airway Patency:  Patent   Post Op Vitals Reviewed: Yes      Staff: CRNA           /70 (02/18/20 0952)    Temp (!) 97 2 °F (36 2 °C) (02/18/20 0952)    Pulse 75 (02/18/20 0952)   Resp 16 (02/18/20 0952)    SpO2 95 % (02/18/20 0952)

## 2020-02-18 NOTE — PERIOPERATIVE NURSING NOTE
Returned from [facility] awake and alert c/o numbness  Fingers warm to touch  Able to wiggle them  Dressing dry and intact  ivs running  Eating and drinking

## 2020-02-18 NOTE — OP NOTE
OPERATIVE REPORT  PATIENT NAME: Anitha Wise    :  1955  MRN: [id number]  Pt Location:  OR ROOM 12    SURGERY DATE: 2020    Surgeon(s) and Role:     * Lawrence Red DO - Primary     * Vi Chen PA-C - Assisting    Preop Diagnosis:  Rupture of right biceps tendon, initial encounter [S46 211A]    Post-Op Diagnosis Codes:     * Rupture of right biceps tendon, initial encounter [S46 211A]    Procedure(s) (LRB):  RIGHT SHOULDER ARTHROSCOPY (Right)  TENODESIS BICEPS OPEN PROXIMAL (Right)    Specimen(s):  * No specimens in log *    Estimated Blood Loss:   Minimal    Drains:  * No LDAs found *    Anesthesia Type:   General    Operative Indications:  Rupture of right biceps tendon, initial encounter [R09 326N]      Complications:   None    Operation:    1  Right shoulder open subpectoral tenodesis of biceps long head  2  Right shoulder arthroscopy with debridement superior labrum and rotator cuff, limited debridement    Findings:    Arthroscopic evaluation of the shoulder revealed the following:   Glenoid articular surface: No notable chondral defects  Humeral head articular surface:   Labrum: Fraying and degenerative tearing of the anterior and superior labrum  Biceps tendon: complete tear with no intra articular portion of biceps  Rotator cuff: Entirely intact without evidence of disruption on the articular or bursal surfaces  Mild fraying of inferior cuff  Inferior recess: No loose bodies or ligament disruption  Subacromial bursa: mildly hypertrophic and hyperemic  Acromion: no spurs   Distal clavicle: normal    Procedure: In the pre-operative holding area, the patient identified the correct operative extremity and I marked that extremity with my initials, using a permanent marker  The patient was then brought to the operating room and positioned supine  Following satisfactory induction of anesthesia, the patient was positioned in the lateral position   The entire operative extremity was draped free and the right shoulder was prepped in the usual sterile fashion for surgery of the shoulder  Before any surgical instrumentation was passed to me by the surgical technician, a formalized time-out occurred, which involves the surgeon, circulating nurse, and anesthesia staff all verifying the correct operative extremity  My initials were visible on the prepped and draped operative field  The anatomic landmarks of the scapular spine, acromion, clavicle, and coracoid process were marked  Subsequently, a posterior portal was marked in the soft recess between the glenoid and humeral head  The posterior portal was established with a scalpel  The arthroscope was introduced through this portal  Under direct visualization, the anterior portal was established with a localizing needle followed by a scalpel  A probe was then introduced into the anterior portal  A systematic diagnostic arthroscopy evaluated the following: glenoid articular surface, humeral head articular surface, labrum, biceps tendon, rotator cuff, and inferior recess  The superior labral complex had detached from the glenoid rim  Specifically, this separation extended from the anterior insertion of the long head of the biceps tendon to approximately the 10 o'clock position  The labrum in this region was grossly unstable to probing  Further, some radial tearing and central fraying was noted in this region  Consequently, the superior labrum was gently debrided with the motorized shaver  Electrocautery was then used to debride any fraying of the labrum  The inferior rotator cuff demonstrated fraying but no full thickness tearing, therefore a debridement of the rotator cuff was performed  This completed the limited arthroscopic debridement portion of the surgery  The arthroscope was withdrawn from the glenohumeral joint and redirected into the subacromial bursa   Under direct visualization, the lateral portal was established with a localizing needle followed by a scalpel  A shaver was introduced into the subacromial space and the space was further established  The acromion, distal clavicle, and rotator cuff were arthroscopically evaluated  No rotator cuff tears were seen  In order to plan the incision for the subpectoral open biceps tenodesis, the inferior border of the pectoralis major tendon was palpated  The incision was extended longitudinally from 2 cm superior to the inferior border to 3 cm distal to the inferior border on the medial aspect of the arm  Dissection was continued sharply through the subcutaneous tissue with electrocautery used to control bleeding  The fascia overlaying the pectoralis major, coracobrachialis, and biceps was identified and incised in a proximal to distal manner  No retractor was used medially in order to avoid any traction or injury to the musculocutaneous nerve  The long head of the biceps tendon was visualized and then withdrawn from its anatomic location  To ensure appropriate tensioning of the biceps tendon, the proximal 6-cm diseased portion of the tendon was resected  Number 2 Fiberwire suture was passed into the tendon using a running, locking technique for a 2-cm portion  A square knot was placed at the end of the tendon  A 2 4-mm guide wire was inserted into the center of the bicipital groove immediately superior to the inferior edge of the pectoralis tendon, to a depth of the posterior cortex without penetration  A 7 5-mm cannulated reamer was advanced over the guide pin to the 23-mm sterling  One limb of the suture was pulled tightly through the Bio-Tenodesis screwdriver handle  The tip of the  and the tendon were seated to the bottom of the bone socket  A 7 mm x 23 mm biocomposite interference screw was advanced until the head of the screw was flush with the base of the bicipital groove  The two suture limbs were then tied together   The arm was rotated from side to side to ensure that there was no prominence from the screw  This completed the open subpectoral biceps tenodesis  The portals were closed with buried 3-0 monocryl suture, using a subcutaneous technique  The skin was cleansed with sterile saline and dried before Steri-Strips were applied  Finally, a sterile dressing was secured by tape, followed by a shoulder brace  The deep fascial layer of the open subpectoral biceps tenodesis incision was closed with 0 Vicryl using an interrupted simple technique  The deep dermal layer was closed with 2-0 Vicryl using a buried interrupted suture technique  The subcuticular layer was closed with a running 4-0 Monocryl suture       I was present for the entire procedure, A qualified resident physician was not available and A physician assistant was required during the procedure for retraction tissue handling,dissection and suturing    Patient Disposition:  PACU     SIGNATURE: Kaushik Alcocer DO  DATE: February 18, 2020  TIME: 8:25 AM

## 2020-02-19 NOTE — ADDENDUM NOTE
Addendum  created 02/19/20 1109 by Alyce Juan MD    Diagnosis association updated, Intraprocedure Blocks edited, Sign clinical note

## 2020-02-23 ENCOUNTER — TELEPHONE (OUTPATIENT)
Dept: OTHER | Facility: OTHER | Age: 65
End: 2020-02-23

## 2020-02-23 NOTE — TELEPHONE ENCOUNTER
PT HAD SHOULDER SURGERY ON Tuesday, 5 DAYS LATER IS DRAINING  AND TOOK OFF ORIGINAL BANDAGE, SEEMED FINE BUT  AFTER  SHOWER, IT IS NOW BOTHERING  WHERE  THE ARM  STITICHES ARE

## 2020-02-24 ENCOUNTER — EVALUATION (OUTPATIENT)
Dept: PHYSICAL THERAPY | Facility: MEDICAL CENTER | Age: 65
End: 2020-02-24
Payer: OTHER MISCELLANEOUS

## 2020-02-24 DIAGNOSIS — S46.211D RUPTURE OF RIGHT BICEPS TENDON, SUBSEQUENT ENCOUNTER: Primary | ICD-10-CM

## 2020-02-24 PROCEDURE — 97110 THERAPEUTIC EXERCISES: CPT | Performed by: PHYSICAL THERAPIST

## 2020-02-24 PROCEDURE — 97161 PT EVAL LOW COMPLEX 20 MIN: CPT | Performed by: PHYSICAL THERAPIST

## 2020-02-24 NOTE — PROGRESS NOTES
PT Evaluation     Today's date: 2020  Patient name: Luis M Boyd  : 1955  MRN: [id number]  Referring provider: Itzel Aguilera  Dx:   Encounter Diagnosis     ICD-10-CM    1  Rupture of right biceps tendon, subsequent encounter S46 211D Ambulatory referral to Physical Therapy                  Assessment  Assessment details: Luis M Boyd is a 76 y o  male was evaluated on 2020  for Rupture of right biceps tendon, subsequent encounter  (primary encounter diagnosis)  Miranda Ibanez has the above listed impairments resulting in functional deficits and negative impact to quality of life  Patient is appropriate for skilled PT intervention to promote maximal return to function and patient specific goals  Patient agrees with outlined treatment plan and all questions were answered to their satisfaction  Impairments: abnormal muscle firing, abnormal muscle tone, abnormal or restricted ROM, impaired physical strength, lacks appropriate home exercise program and pain with function  Understanding of Dx/Px/POC: good   Prognosis: good    Goals  Patient will successfully transition to home exercise program   Patient will be able to manage symptoms independently      Patient will resume work activity without limitation  Patient will resume normal daily activity without limitation      Plan  Patient would benefit from: skilled PT  Referral necessary: No  Planned modality interventions: thermotherapy: hydrocollator packs  Planned therapy interventions: home exercise program, manual therapy, neuromuscular re-education, patient education, functional ROM exercises, strengthening, stretching, joint mobilization, graded activity, graded exercise, therapeutic exercise, body mechanics training, motor coordination training and activity modification  Frequency: 2x week  Duration in weeks: 12  Treatment plan discussed with: patient        Subjective Evaluation    History of Present Illness  Mechanism of injury: Enrique Ferrell is a 76 y o  male presenting to therapy s/p R bicep tenodesis and RTC debridement on 20  He initially had bicep rupture on 10/5 and was doing well initially however had begun to have issues with reaching and was bothered by the balled up muscle in the crease of his elbow  He underwent repair and doing well post operatively    Had a rash near underarm which has been responding to cortisone cream   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    Pain  Current pain ratin  At best pain ratin  At worst pain rating: 3  Quality: dull ache    Patient Goals  Patient goals for therapy: decreased pain, increased strength and return to work          Objective     Cervical/Thoracic Screen   Cervical range of motion within normal limits  Thoracic range of motion within normal limits    Neurological Testing     Sensation     Shoulder   Left Shoulder   Intact: pin prick    Right Shoulder   Intact: pin prick    Passive Range of Motion   Left Shoulder   Normal passive range of motion    Right Shoulder   Flexion: 145 degrees   Abduction: 145 degrees   External rotation 0°: 45 degrees   Internal rotation 0°: 55 degrees     Left Elbow   Normal passive range of motion    Right Elbow   Normal passive range of motion      Flowsheet Rows      Most Recent Value   PT/OT G-Codes   Current Score  4   Projected Score  48             Precautions: Protocol      Manual              PROM                                                                     Exercise Diary                                                                                                                                                                                                                                                                                      Modalities

## 2020-02-24 NOTE — TELEPHONE ENCOUNTER
Called and spoke directly with the patient  He denied drainage coming from the incisions  He stated that he was experiencing a rash in his armpit  He called his pharmacy that recommended for him to use hydrocortisone cream   He applied the cream and gauze into the axilla and this rash has resolved  He stated that the rash did bubble and was draining  Instructed to call if his condition worsens   Thanks

## 2020-02-28 ENCOUNTER — OFFICE VISIT (OUTPATIENT)
Dept: PHYSICAL THERAPY | Facility: MEDICAL CENTER | Age: 65
End: 2020-02-28
Payer: OTHER MISCELLANEOUS

## 2020-02-28 DIAGNOSIS — S46.211D RUPTURE OF RIGHT BICEPS TENDON, SUBSEQUENT ENCOUNTER: Primary | ICD-10-CM

## 2020-02-28 PROCEDURE — 97110 THERAPEUTIC EXERCISES: CPT | Performed by: PHYSICAL THERAPIST

## 2020-02-28 NOTE — PROGRESS NOTES
Daily Note     Today's date: 2020  Patient name: Irina Sauceda  : 1955  MRN: [id number]  Referring provider: Angeli Van  Dx:   Encounter Diagnosis     ICD-10-CM    1  Rupture of right biceps tendon, subsequent encounter S46 658S                   Subjective: Cecelia Levy reports that he is doing well and has been doing his exercises for home      Objective: See treatment diary below      Assessment: Tolerated treatment well  Patient exhibited good technique with therapeutic exercises  Good tolerance to initial TE    Plan: Continue per plan of care        Precautions: Protocol      Manual              PROM AF                                                                    Exercise Diary                                                                                                                                                                                                                                                                                      Modalities

## 2020-03-02 ENCOUNTER — OFFICE VISIT (OUTPATIENT)
Dept: OBGYN CLINIC | Facility: MEDICAL CENTER | Age: 65
End: 2020-03-02

## 2020-03-02 ENCOUNTER — OFFICE VISIT (OUTPATIENT)
Dept: PHYSICAL THERAPY | Facility: MEDICAL CENTER | Age: 65
End: 2020-03-02
Payer: OTHER MISCELLANEOUS

## 2020-03-02 VITALS
HEIGHT: 69 IN | HEART RATE: 81 BPM | SYSTOLIC BLOOD PRESSURE: 138 MMHG | BODY MASS INDEX: 33.18 KG/M2 | DIASTOLIC BLOOD PRESSURE: 84 MMHG | WEIGHT: 224 LBS

## 2020-03-02 DIAGNOSIS — S46.211D RUPTURE OF RIGHT BICEPS TENDON, SUBSEQUENT ENCOUNTER: Primary | ICD-10-CM

## 2020-03-02 DIAGNOSIS — S46.211D RUPTURE OF RIGHT PROXIMAL BICEPS TENDON, SUBSEQUENT ENCOUNTER: Primary | ICD-10-CM

## 2020-03-02 PROCEDURE — 97110 THERAPEUTIC EXERCISES: CPT | Performed by: PHYSICAL THERAPIST

## 2020-03-02 PROCEDURE — 99024 POSTOP FOLLOW-UP VISIT: CPT | Performed by: ORTHOPAEDIC SURGERY

## 2020-03-02 RX ORDER — NYSTATIN 100000 [USP'U]/G
POWDER TOPICAL 4 TIMES DAILY
Qty: 15 G | Refills: 0 | Status: SHIPPED | OUTPATIENT
Start: 2020-03-02

## 2020-03-02 NOTE — PROGRESS NOTES
Shoulder Post Operative Visit     Assesment:     Surgical Arthroscopy of the right shoulder with limited debridement and open subpectoral biceps tenodesis    Plan:    Post-Operative treatment:    Ice to shoulder 1-2 times daily, for 20 minutes at a time  PT for ROM and strengthening to shoulder, rotator cuff, scapular stabilizers  Nystatin powder prescribed for small fungal rash under armpit that is improving    Imaging:    None today    Sling:  at all times other than showering for 2 more weeks    DVT Prophylaxis:  Ambulation    Follow up:   6 weeks     Patient was advised that if they have any fevers, chills, chest pain, shortness of breath, redness or drainage from the incision, please let our office know immediately  History of Present Illness: The patient is a 76 y o  male who is being evaluated post operatively 2 weeks  status post arthroscopy of the right shoulder with limited debridement and open subpectoral biceps tenodesis  Since the prior visit, He reports significant improvement  Pain is well controlled  The patient is using ice to control swelling  They have started physical therapy  The patient is ambulating for DVT ppx  The patient is using their sling at all times other than showering    The patient denies any fevers, chills, calf pain, chest pain/shortness of breath, redness or drainage from the incision  He does note a small rash in the armpit that has been improving over the last several days    I have reviewed the past medical, surgical, social and family history, medications and allergies as documented in the EMR  Review of systems: ROS is negative other than that noted in the HPI  Constitutional: Negative for fatigue and fever  Physical Exam:    Blood pressure 138/84, pulse 81, height 5' 9" (1 753 m), weight 102 kg (224 lb)      General/Constitutional: NAD, well developed, well nourished  HENT: Normocephalic, atraumatic  CV: Intact distal pulses, regular rate  Resp: No respiratory distress or labored breathing  Lymphatic: No lymphadenopathy palpated  Neuro: Alert and Oriented x 3, no focal deficits  Psych: Normal mood, normal affect, normal judgement, normal behavior  Skin: Warm, dry, no rashes, no erythema    Shoulder focused exam:       RIGHT LEFT    Scapula Atrophy Negative Negative     Winging Negative Negative     Protraction Negative Negative    Rotator cuff SS 5/5 5/5     IS 5/5 5/5     SubS 5/5 5/5    ROM  170     ER0 60 60     ER90 90 90     IR90 T6 T6     IRb T6 T6    TTP:  None None    Stability:  stable stable      Incisions show no erythema, no drainage    UE NV Exam: +2 Radial pulses bilaterally  Sensation intact to light touch C5-T1 bilaterally, Radial/median/ulnar nerve motor intact

## 2020-03-02 NOTE — PROGRESS NOTES
Daily Note     Today's date: 3/2/2020  Patient name: Vicki Scott  : 1955  MRN: [id number]  Referring provider: León Higgins  Dx:   Encounter Diagnosis     ICD-10-CM    1  Rupture of right biceps tendon, subsequent encounter S46 211D                   Subjective: Enrique Ferrell reports that he just had check up with MD, doing well  Objective: See treatment diary below      Assessment: Tolerated treatment well  Patient with greatly improved shoulder motion and full elbow mobility  Continue to work on scapular control and mechanics      Plan: Continue per plan of care        Precautions: Protocol      Manual  2/28 3/2           PROM AF AF                                                                   Exercise Diary              Prone scapular retraction  20           Prone extension  20           Supine serratus punch hands clasped  2-0                                                                                                                                                                                                                                            Modalities              CP  10

## 2020-03-06 ENCOUNTER — OFFICE VISIT (OUTPATIENT)
Dept: PHYSICAL THERAPY | Facility: MEDICAL CENTER | Age: 65
End: 2020-03-06
Payer: OTHER MISCELLANEOUS

## 2020-03-06 DIAGNOSIS — S46.211D RUPTURE OF RIGHT BICEPS TENDON, SUBSEQUENT ENCOUNTER: Primary | ICD-10-CM

## 2020-03-06 PROCEDURE — 97140 MANUAL THERAPY 1/> REGIONS: CPT | Performed by: PHYSICAL THERAPIST

## 2020-03-06 PROCEDURE — 97110 THERAPEUTIC EXERCISES: CPT | Performed by: PHYSICAL THERAPIST

## 2020-03-06 PROCEDURE — 97010 HOT OR COLD PACKS THERAPY: CPT | Performed by: PHYSICAL THERAPIST

## 2020-03-06 NOTE — PROGRESS NOTES
Daily Note     Today's date: 3/6/2020  Patient name: Néstor Mishra  : 1955  MRN: [id number]  Referring provider: Demarcus Bashir  Dx:   Encounter Diagnosis     ICD-10-CM    1  Rupture of right biceps tendon, subsequent encounter S46 133D                   Subjective: Patient reports he woke up sleeping on his right shoulder and is sore  He reports today is the most sore he has been since the surgery  He reports fear of disrupting the surgery from rolling onto his right shoulder  Objective: See treatment diary below      Assessment: Positive response to PROM with decreased soreness reported  Shoulder and elbow mobility are good  Patient exhibited good technique with TE  Added table slides for AAROM  Patient will benefit from continued PT services for appropriate progression of treatment plan as he progresses each week from surgery  Plan: Continue per plan of care  Progress treament per protocol        Precautions: Protocol      Manual  2/28 3/2 3/6          PROM AF AF CK                                                                  Exercise Diary              Prone scapular retraction  20 20          Prone extension  20 20          Supine serratus punch hands clasped  2-0 20          Table slides; flexion   15                                                                                                                                                                                                                              Modalities              CP  10

## 2020-03-09 ENCOUNTER — OFFICE VISIT (OUTPATIENT)
Dept: PHYSICAL THERAPY | Facility: MEDICAL CENTER | Age: 65
End: 2020-03-09
Payer: OTHER MISCELLANEOUS

## 2020-03-09 DIAGNOSIS — S46.211D RUPTURE OF RIGHT BICEPS TENDON, SUBSEQUENT ENCOUNTER: Primary | ICD-10-CM

## 2020-03-09 PROCEDURE — 97110 THERAPEUTIC EXERCISES: CPT | Performed by: PHYSICAL THERAPIST

## 2020-03-09 NOTE — PROGRESS NOTES
Daily Note     Today's date: 3/9/2020  Patient name: Néstor Mishra  : 1955  MRN: [id number]  Referring provider: Demarcus Bashir  Dx:   Encounter Diagnosis     ICD-10-CM    1  Rupture of right biceps tendon, subsequent encounter S46 211D                   Subjective: Tasneem Ta reports that he is feeling better and not having much discomfort       Objective: See treatment diary below      Assessment: Tolerated treatment well  Patient tolerating progression well and shoulder mobility is improving  Minimal discomfort at end range      Plan: Continue per plan of care        Precautions: Protocol      Manual  2/28 3/2 3/6 3/9         PROM AF AF CK AF                                                                 Exercise Diary              Prone scapular retraction  20 20 20         Prone extension  20 20 20         Supine serratus punch hands clasped  2-0 20 20         Table slides; flexion   15 15         Shoulder isometrics    3 sec  X15                                                                                                                                                                                                                Modalities              CP  10

## 2020-03-13 ENCOUNTER — OFFICE VISIT (OUTPATIENT)
Dept: PHYSICAL THERAPY | Facility: MEDICAL CENTER | Age: 65
End: 2020-03-13
Payer: OTHER MISCELLANEOUS

## 2020-03-13 DIAGNOSIS — S46.211D RUPTURE OF RIGHT BICEPS TENDON, SUBSEQUENT ENCOUNTER: Primary | ICD-10-CM

## 2020-03-13 PROCEDURE — 97110 THERAPEUTIC EXERCISES: CPT | Performed by: PHYSICAL THERAPIST

## 2020-03-13 PROCEDURE — 97140 MANUAL THERAPY 1/> REGIONS: CPT | Performed by: PHYSICAL THERAPIST

## 2020-03-13 NOTE — PROGRESS NOTES
Daily Note     Today's date: 3/13/2020  Patient name: Bianka Madsen  : 1955  MRN: [id number]  Referring provider: Serena Montesinos  Dx:   Encounter Diagnosis     ICD-10-CM    1  Rupture of right biceps tendon, subsequent encounter S46 007D                   Subjective: Leslye Interiano reports that he is doing well and having less and less soreness       Objective: See treatment diary below      Assessment: Tolerated treatment well  Patient is tolerating addition of some light active motion well without issue  Will be able to discontinue sling next week      Plan: Continue per plan of care        Precautions: Protocol      Manual  2/28 3/2 3/6 3/9 3/13        PROM AF AF CK AF AF                                                                Exercise Diary              Prone scapular retraction  20 20 20 30        Prone extension  20 20 20 30        Supine serratus punch hands clasped  2-0 20 20 30        Table slides; flexion   15 15         Shoulder isometrics    3 sec  X15 3 sec  15        Supine AAROM     15        Elbow AROM     20                                                                                                                                                                                     Modalities              CP  10

## 2020-03-16 ENCOUNTER — OFFICE VISIT (OUTPATIENT)
Dept: PHYSICAL THERAPY | Facility: MEDICAL CENTER | Age: 65
End: 2020-03-16
Payer: OTHER MISCELLANEOUS

## 2020-03-16 DIAGNOSIS — S46.211D RUPTURE OF RIGHT BICEPS TENDON, SUBSEQUENT ENCOUNTER: Primary | ICD-10-CM

## 2020-03-16 PROCEDURE — 97140 MANUAL THERAPY 1/> REGIONS: CPT | Performed by: PHYSICAL THERAPIST

## 2020-03-16 PROCEDURE — 97110 THERAPEUTIC EXERCISES: CPT | Performed by: PHYSICAL THERAPIST

## 2020-03-16 NOTE — PROGRESS NOTES
Daily Note     Today's date: 3/16/2020  Patient name: Viviana Jones  : 1955  MRN: [id number]  Referring provider: Derick Farmer  Dx:   Encounter Diagnosis     ICD-10-CM    1  Rupture of right biceps tendon, subsequent encounter S46 211D                   Subjective: Manju Castro reports that he is doing well and beginning to use his arm for light daily activity       Objective: See treatment diary below      Assessment: Tolerated treatment well  Patient with good tolerance to active shoulder mobility and light daily actiivty  Continue to progress along protocol      Plan: Continue per plan of care        Precautions: Protocol      Manual  2/28 3/2 3/6 3/9 3/13 3/16       PROM AF AF CK AF AF AF                                                               Exercise Diary              Prone scapular retraction  20 20 20 30 30       Prone extension  20 20 20 30 30       Supine serratus punch hands clasped  2-0 20 20 30 30       Table slides; flexion   15 15         Shoulder isometrics    3 sec  X15 3 sec  15 3 sec  X15       Supine AAROM     15 20       Elbow AROM     20 30       Supination       30                                                                                                                                                                       Modalities              CP  10

## 2020-03-20 ENCOUNTER — OFFICE VISIT (OUTPATIENT)
Dept: PHYSICAL THERAPY | Facility: MEDICAL CENTER | Age: 65
End: 2020-03-20
Payer: OTHER MISCELLANEOUS

## 2020-03-20 DIAGNOSIS — S46.211D RUPTURE OF RIGHT BICEPS TENDON, SUBSEQUENT ENCOUNTER: Primary | ICD-10-CM

## 2020-03-20 PROCEDURE — 97140 MANUAL THERAPY 1/> REGIONS: CPT | Performed by: PHYSICAL THERAPIST

## 2020-03-20 PROCEDURE — 97112 NEUROMUSCULAR REEDUCATION: CPT | Performed by: PHYSICAL THERAPIST

## 2020-03-20 PROCEDURE — 97110 THERAPEUTIC EXERCISES: CPT | Performed by: PHYSICAL THERAPIST

## 2020-03-20 NOTE — PROGRESS NOTES
Daily Note     Today's date: 3/20/2020  Patient name: Uzma Benedict  : 1955  MRN: [id number]  Referring provider: Rodrigo Lugo  Dx:   Encounter Diagnosis     ICD-10-CM    1  Rupture of right biceps tendon, subsequent encounter S46 211D                   Subjective: Patient reports that he has some soreness at times in the morning because he has not been wearing his sling at night  He has been using his R arm to lift a coffee cup with no significant difficulty  Objective: See treatment diary below      Assessment: Patient continues to be demonstrating good progress in R shoulder PROM  Cueing provided to prevent compensation during prone scapular strengthening  Patient was educated that he may wear his sling if he develops a lot of soreness, but he can continue to wean out of the sling if symptoms continue to improve  Patient would benefit from continued PT to progress shoulder ROM and strength per protocol to return to PLOF  Plan: Continue per plan of care        Precautions: Protocol      Manual  2/28 3/2 3/6 3/9 3/13 3/16 3/20      PROM AF AF CK AF AF AF KP                                                              Exercise Diary        3/20      Prone scapular retraction  20 20 20 30 30 30      Prone extension  20 20 20 30 30 30      Supine serratus punch hands clasped  2-0 20 20 30 30 30      Table slides; flexion   15 15         Shoulder isometrics    3 sec  X15 3 sec  15 3 sec  X15 3 sec x15      Supine AAROM     15 20 30      Elbow AROM     20 30 30      Supination       30 30                                                                                                                                                                      Modalities              CP  10

## 2020-03-23 ENCOUNTER — OFFICE VISIT (OUTPATIENT)
Dept: PHYSICAL THERAPY | Facility: MEDICAL CENTER | Age: 65
End: 2020-03-23
Payer: OTHER MISCELLANEOUS

## 2020-03-23 DIAGNOSIS — S46.211D RUPTURE OF RIGHT BICEPS TENDON, SUBSEQUENT ENCOUNTER: Primary | ICD-10-CM

## 2020-03-23 PROCEDURE — 97110 THERAPEUTIC EXERCISES: CPT | Performed by: PHYSICAL THERAPIST

## 2020-03-23 PROCEDURE — 97140 MANUAL THERAPY 1/> REGIONS: CPT | Performed by: PHYSICAL THERAPIST

## 2020-03-23 PROCEDURE — 97112 NEUROMUSCULAR REEDUCATION: CPT | Performed by: PHYSICAL THERAPIST

## 2020-03-23 NOTE — PROGRESS NOTES
Daily Note     Today's date: 3/23/2020  Patient name: Lindsey Koenig  : 1955  MRN: [id number]  Referring provider: Zoey Nelson  Dx:   Encounter Diagnosis     ICD-10-CM    1  Rupture of right biceps tendon, subsequent encounter S46 211D                   Subjective: Kathie Moran reports that he is doing well, did some work in his office this weekend and felt slight soreness       Objective: See treatment diary below      Assessment: Tolerated treatment well  Patient demonstrated fatigue post treatment  Progressing well       Plan: Continue per plan of care        Precautions: Protocol      Manual  2/28 3/2 3/6 3/9 3/13 3/16 3/20 3/23     PROM AF AF CK AF AF AF KP AF                                                             Exercise Diary        3/20      Prone scapular retraction  20 20 20 30 30 30 30     Prone extension  20 20 20 30 30 30 30     Supine serratus punch hands clasped  2-0 20 20 30 30 30 30     Table slides; flexion   15 15         Shoulder isometrics    3 sec  X15 3 sec  15 3 sec  X15 3 sec x15 3 sec  X15     Supine AAROM     15 20 30 30     Elbow AROM     20 30 30 30     Supination       30 30 30     Prone row        2#  3X10     Tb row        Red  3X10                                                                                                                                           Modalities              CP  10

## 2020-03-27 ENCOUNTER — OFFICE VISIT (OUTPATIENT)
Dept: PHYSICAL THERAPY | Facility: MEDICAL CENTER | Age: 65
End: 2020-03-27
Payer: OTHER MISCELLANEOUS

## 2020-03-27 DIAGNOSIS — S46.211D RUPTURE OF RIGHT BICEPS TENDON, SUBSEQUENT ENCOUNTER: Primary | ICD-10-CM

## 2020-03-27 PROCEDURE — 97112 NEUROMUSCULAR REEDUCATION: CPT | Performed by: PHYSICAL THERAPIST

## 2020-03-27 PROCEDURE — 97140 MANUAL THERAPY 1/> REGIONS: CPT | Performed by: PHYSICAL THERAPIST

## 2020-03-27 PROCEDURE — 97110 THERAPEUTIC EXERCISES: CPT | Performed by: PHYSICAL THERAPIST

## 2020-03-27 NOTE — PROGRESS NOTES
Daily Note     Today's date: 3/27/2020  Patient name: Holly Pino  : 1955  MRN: [id number]  Referring provider: Donna Kinsey  Dx:   Encounter Diagnosis     ICD-10-CM    1  Rupture of right biceps tendon, subsequent encounter S46 211D                   Subjective: Unk Sathish Unk Sathish Eunice Duque reports he is doing well and not having many issues      Objective: See treatment diary below      Assessment: Tolerated treatment well  Patient tolerates scapular strengthening well, limited in active behind back reaching  Began self stretching today to increase available motion       Plan: Continue per plan of care        Precautions: Protocol      Manual  2/28 3/2 3/6 3/9 3/13 3/16 3/20 3/23 3/27    PROM AF AF CK AF AF AF KP AF AF                                                            Exercise Diary        3/20      Prone scapular retraction  20 20 20 30 30 30 30 30    Prone extension  20 20 20 30 30 30 30 30    Supine serratus punch hands clasped  2-0 20 20 30 30 30 30 30    Table slides; flexion   15 15         Shoulder isometrics    3 sec  X15 3 sec  15 3 sec  X15 3 sec x15 3 sec  X15 3 sec  X15    Supine AAROM     15 20 30 30 30    Elbow AROM     20 30 30 30 30    Supination       30 30 30 30    Prone row        2#  3X10 23  3X10    Tb row        Red  3X10 Red  3X10    Strap IR stretch         10 sec  X15                                                                                                                             Modalities              CP  10

## 2020-03-30 ENCOUNTER — OFFICE VISIT (OUTPATIENT)
Dept: PHYSICAL THERAPY | Facility: MEDICAL CENTER | Age: 65
End: 2020-03-30
Payer: OTHER MISCELLANEOUS

## 2020-03-30 DIAGNOSIS — S46.211D RUPTURE OF RIGHT BICEPS TENDON, SUBSEQUENT ENCOUNTER: Primary | ICD-10-CM

## 2020-03-30 PROCEDURE — 97110 THERAPEUTIC EXERCISES: CPT | Performed by: PHYSICAL THERAPIST

## 2020-03-30 PROCEDURE — 97140 MANUAL THERAPY 1/> REGIONS: CPT | Performed by: PHYSICAL THERAPIST

## 2020-03-30 PROCEDURE — 97112 NEUROMUSCULAR REEDUCATION: CPT | Performed by: PHYSICAL THERAPIST

## 2020-03-30 NOTE — PROGRESS NOTES
Daily Note     Today's date: 3/30/2020  Patient name: Luis M Boyd  : 1955  MRN: [id number]  Referring provider: Itzel Aguilera  Dx:   Encounter Diagnosis     ICD-10-CM    1  Rupture of right biceps tendon, subsequent encounter S46 481D                   Subjective: Bre Belcher reports that he is doing well and doing more with his arm for daily activity       Objective: See treatment diary below      Assessment: Tolerated treatment well  Patient exhibited good technique with therapeutic exercises and would benefit from continued PT      Plan: Continue per plan of care        Precautions: Protocol      Manual  2/28 3/2 3/6 3/9 3/13 3/16 3/20 3/23 3/27 3/30   PROM AF AF CK AF AF AF KP AF AF                                                            Exercise Diary        3/20      Prone scapular retraction  20 20 20 30 30 30 30 30 30   Prone extension  20 20 20 30 30 30 30 30 30   Supine serratus punch hands clasped  2-0 20 20 30 30 30 30 30 30   Table slides; flexion   15 15         Shoulder isometrics    3 sec  X15 3 sec  15 3 sec  X15 3 sec x15 3 sec  X15 3 sec  X15 3 sec  X15   Supine AAROM     15 20 30 30 30 30   Elbow AROM     20 30 30 30 30 30   Supination       30 30 30 30 30   Prone row        2#  3X10 23  3X10 2#  3X10   Tb row        Red  3X10 Red  3X10 Red  3X10   Strap IR stretch         10 sec  X15 10 sec  X15                                                                                                                            Modalities              CP  10

## 2020-04-03 ENCOUNTER — OFFICE VISIT (OUTPATIENT)
Dept: PHYSICAL THERAPY | Facility: MEDICAL CENTER | Age: 65
End: 2020-04-03
Payer: OTHER MISCELLANEOUS

## 2020-04-03 DIAGNOSIS — S46.211D RUPTURE OF RIGHT BICEPS TENDON, SUBSEQUENT ENCOUNTER: Primary | ICD-10-CM

## 2020-04-03 PROCEDURE — 97110 THERAPEUTIC EXERCISES: CPT | Performed by: PHYSICAL THERAPIST

## 2020-04-03 PROCEDURE — 97140 MANUAL THERAPY 1/> REGIONS: CPT | Performed by: PHYSICAL THERAPIST

## 2020-04-03 PROCEDURE — 97112 NEUROMUSCULAR REEDUCATION: CPT | Performed by: PHYSICAL THERAPIST

## 2020-04-06 ENCOUNTER — OFFICE VISIT (OUTPATIENT)
Dept: PHYSICAL THERAPY | Facility: MEDICAL CENTER | Age: 65
End: 2020-04-06
Payer: OTHER MISCELLANEOUS

## 2020-04-06 DIAGNOSIS — S46.211D RUPTURE OF RIGHT BICEPS TENDON, SUBSEQUENT ENCOUNTER: Primary | ICD-10-CM

## 2020-04-06 PROCEDURE — 97110 THERAPEUTIC EXERCISES: CPT | Performed by: PHYSICAL THERAPIST

## 2020-04-06 PROCEDURE — 97140 MANUAL THERAPY 1/> REGIONS: CPT | Performed by: PHYSICAL THERAPIST

## 2020-04-06 PROCEDURE — 97112 NEUROMUSCULAR REEDUCATION: CPT | Performed by: PHYSICAL THERAPIST

## 2020-04-10 ENCOUNTER — OFFICE VISIT (OUTPATIENT)
Dept: PHYSICAL THERAPY | Facility: MEDICAL CENTER | Age: 65
End: 2020-04-10
Payer: OTHER MISCELLANEOUS

## 2020-04-10 DIAGNOSIS — S46.211D RUPTURE OF RIGHT BICEPS TENDON, SUBSEQUENT ENCOUNTER: Primary | ICD-10-CM

## 2020-04-10 PROCEDURE — 97530 THERAPEUTIC ACTIVITIES: CPT | Performed by: PHYSICAL THERAPIST

## 2020-04-10 PROCEDURE — 97112 NEUROMUSCULAR REEDUCATION: CPT | Performed by: PHYSICAL THERAPIST

## 2020-04-10 PROCEDURE — 97110 THERAPEUTIC EXERCISES: CPT | Performed by: PHYSICAL THERAPIST

## 2020-04-10 PROCEDURE — 97010 HOT OR COLD PACKS THERAPY: CPT | Performed by: PHYSICAL THERAPIST

## 2020-04-10 PROCEDURE — 97140 MANUAL THERAPY 1/> REGIONS: CPT | Performed by: PHYSICAL THERAPIST

## 2020-04-13 ENCOUNTER — APPOINTMENT (OUTPATIENT)
Dept: PHYSICAL THERAPY | Facility: MEDICAL CENTER | Age: 65
End: 2020-04-13
Payer: OTHER MISCELLANEOUS

## 2020-04-17 ENCOUNTER — OFFICE VISIT (OUTPATIENT)
Dept: PHYSICAL THERAPY | Facility: MEDICAL CENTER | Age: 65
End: 2020-04-17
Payer: OTHER MISCELLANEOUS

## 2020-04-17 DIAGNOSIS — S46.211D RUPTURE OF RIGHT BICEPS TENDON, SUBSEQUENT ENCOUNTER: Primary | ICD-10-CM

## 2020-04-17 PROCEDURE — 97140 MANUAL THERAPY 1/> REGIONS: CPT | Performed by: PHYSICAL THERAPIST

## 2020-04-17 PROCEDURE — 97112 NEUROMUSCULAR REEDUCATION: CPT | Performed by: PHYSICAL THERAPIST

## 2020-04-17 PROCEDURE — 97110 THERAPEUTIC EXERCISES: CPT | Performed by: PHYSICAL THERAPIST

## 2020-04-20 ENCOUNTER — OFFICE VISIT (OUTPATIENT)
Dept: OBGYN CLINIC | Facility: MEDICAL CENTER | Age: 65
End: 2020-04-20

## 2020-04-20 ENCOUNTER — OFFICE VISIT (OUTPATIENT)
Dept: PHYSICAL THERAPY | Facility: MEDICAL CENTER | Age: 65
End: 2020-04-20
Payer: OTHER MISCELLANEOUS

## 2020-04-20 VITALS — HEIGHT: 69 IN | WEIGHT: 224 LBS | TEMPERATURE: 98.3 F | BODY MASS INDEX: 33.18 KG/M2

## 2020-04-20 DIAGNOSIS — S46.211D RUPTURE OF RIGHT BICEPS TENDON, SUBSEQUENT ENCOUNTER: Primary | ICD-10-CM

## 2020-04-20 PROCEDURE — 97110 THERAPEUTIC EXERCISES: CPT | Performed by: PHYSICAL THERAPIST

## 2020-04-20 PROCEDURE — 99024 POSTOP FOLLOW-UP VISIT: CPT | Performed by: PHYSICIAN ASSISTANT

## 2020-04-20 PROCEDURE — 97140 MANUAL THERAPY 1/> REGIONS: CPT | Performed by: PHYSICAL THERAPIST

## 2020-04-20 PROCEDURE — 97112 NEUROMUSCULAR REEDUCATION: CPT | Performed by: PHYSICAL THERAPIST

## 2020-04-24 ENCOUNTER — OFFICE VISIT (OUTPATIENT)
Dept: PHYSICAL THERAPY | Facility: MEDICAL CENTER | Age: 65
End: 2020-04-24
Payer: OTHER MISCELLANEOUS

## 2020-04-24 DIAGNOSIS — S46.211D RUPTURE OF RIGHT BICEPS TENDON, SUBSEQUENT ENCOUNTER: Primary | ICD-10-CM

## 2020-04-24 PROCEDURE — 97140 MANUAL THERAPY 1/> REGIONS: CPT | Performed by: PHYSICAL THERAPIST

## 2020-04-24 PROCEDURE — 97110 THERAPEUTIC EXERCISES: CPT | Performed by: PHYSICAL THERAPIST

## 2020-04-24 PROCEDURE — 97112 NEUROMUSCULAR REEDUCATION: CPT | Performed by: PHYSICAL THERAPIST

## 2020-04-27 ENCOUNTER — OFFICE VISIT (OUTPATIENT)
Dept: PHYSICAL THERAPY | Facility: MEDICAL CENTER | Age: 65
End: 2020-04-27
Payer: OTHER MISCELLANEOUS

## 2020-04-27 DIAGNOSIS — S46.211D RUPTURE OF RIGHT BICEPS TENDON, SUBSEQUENT ENCOUNTER: Primary | ICD-10-CM

## 2020-04-27 PROCEDURE — 97110 THERAPEUTIC EXERCISES: CPT | Performed by: PHYSICAL THERAPIST

## 2020-04-27 PROCEDURE — 97112 NEUROMUSCULAR REEDUCATION: CPT | Performed by: PHYSICAL THERAPIST

## 2020-04-27 PROCEDURE — 97140 MANUAL THERAPY 1/> REGIONS: CPT | Performed by: PHYSICAL THERAPIST

## 2020-05-01 ENCOUNTER — OFFICE VISIT (OUTPATIENT)
Dept: PHYSICAL THERAPY | Facility: MEDICAL CENTER | Age: 65
End: 2020-05-01
Payer: OTHER MISCELLANEOUS

## 2020-05-01 DIAGNOSIS — S46.211D RUPTURE OF RIGHT BICEPS TENDON, SUBSEQUENT ENCOUNTER: Primary | ICD-10-CM

## 2020-05-01 PROCEDURE — 97140 MANUAL THERAPY 1/> REGIONS: CPT | Performed by: PHYSICAL THERAPIST

## 2020-05-01 PROCEDURE — 97110 THERAPEUTIC EXERCISES: CPT | Performed by: PHYSICAL THERAPIST

## 2020-05-01 PROCEDURE — 97530 THERAPEUTIC ACTIVITIES: CPT | Performed by: PHYSICAL THERAPIST

## 2020-05-01 PROCEDURE — 97112 NEUROMUSCULAR REEDUCATION: CPT | Performed by: PHYSICAL THERAPIST

## 2020-05-04 ENCOUNTER — OFFICE VISIT (OUTPATIENT)
Dept: PHYSICAL THERAPY | Facility: MEDICAL CENTER | Age: 65
End: 2020-05-04
Payer: OTHER MISCELLANEOUS

## 2020-05-04 DIAGNOSIS — S46.211D RUPTURE OF RIGHT BICEPS TENDON, SUBSEQUENT ENCOUNTER: Primary | ICD-10-CM

## 2020-05-04 PROCEDURE — 97530 THERAPEUTIC ACTIVITIES: CPT | Performed by: PHYSICAL THERAPIST

## 2020-05-04 PROCEDURE — 97112 NEUROMUSCULAR REEDUCATION: CPT | Performed by: PHYSICAL THERAPIST

## 2020-05-04 PROCEDURE — 97140 MANUAL THERAPY 1/> REGIONS: CPT | Performed by: PHYSICAL THERAPIST

## 2020-05-04 PROCEDURE — 97110 THERAPEUTIC EXERCISES: CPT | Performed by: PHYSICAL THERAPIST

## 2020-05-08 ENCOUNTER — OFFICE VISIT (OUTPATIENT)
Dept: PHYSICAL THERAPY | Facility: MEDICAL CENTER | Age: 65
End: 2020-05-08
Payer: OTHER MISCELLANEOUS

## 2020-05-08 DIAGNOSIS — S46.211D RUPTURE OF RIGHT BICEPS TENDON, SUBSEQUENT ENCOUNTER: Primary | ICD-10-CM

## 2020-05-08 PROCEDURE — 97112 NEUROMUSCULAR REEDUCATION: CPT | Performed by: PHYSICAL THERAPIST

## 2020-05-08 PROCEDURE — 97530 THERAPEUTIC ACTIVITIES: CPT | Performed by: PHYSICAL THERAPIST

## 2020-05-08 PROCEDURE — 97110 THERAPEUTIC EXERCISES: CPT | Performed by: PHYSICAL THERAPIST

## 2020-05-08 PROCEDURE — 97140 MANUAL THERAPY 1/> REGIONS: CPT | Performed by: PHYSICAL THERAPIST

## 2020-05-11 ENCOUNTER — OFFICE VISIT (OUTPATIENT)
Dept: PHYSICAL THERAPY | Facility: MEDICAL CENTER | Age: 65
End: 2020-05-11
Payer: OTHER MISCELLANEOUS

## 2020-05-11 DIAGNOSIS — S46.211D RUPTURE OF RIGHT BICEPS TENDON, SUBSEQUENT ENCOUNTER: Primary | ICD-10-CM

## 2020-05-11 PROCEDURE — 97140 MANUAL THERAPY 1/> REGIONS: CPT | Performed by: PHYSICAL THERAPIST

## 2020-05-11 PROCEDURE — 97530 THERAPEUTIC ACTIVITIES: CPT | Performed by: PHYSICAL THERAPIST

## 2020-05-11 PROCEDURE — 97112 NEUROMUSCULAR REEDUCATION: CPT | Performed by: PHYSICAL THERAPIST

## 2020-05-11 PROCEDURE — 97110 THERAPEUTIC EXERCISES: CPT | Performed by: PHYSICAL THERAPIST

## 2020-05-15 ENCOUNTER — OFFICE VISIT (OUTPATIENT)
Dept: PHYSICAL THERAPY | Facility: MEDICAL CENTER | Age: 65
End: 2020-05-15
Payer: OTHER MISCELLANEOUS

## 2020-05-15 DIAGNOSIS — S46.211D RUPTURE OF RIGHT BICEPS TENDON, SUBSEQUENT ENCOUNTER: Primary | ICD-10-CM

## 2020-05-15 PROCEDURE — 97110 THERAPEUTIC EXERCISES: CPT | Performed by: PHYSICAL THERAPIST

## 2020-05-15 PROCEDURE — 97140 MANUAL THERAPY 1/> REGIONS: CPT | Performed by: PHYSICAL THERAPIST

## 2020-05-15 PROCEDURE — 97112 NEUROMUSCULAR REEDUCATION: CPT | Performed by: PHYSICAL THERAPIST

## 2020-05-18 ENCOUNTER — OFFICE VISIT (OUTPATIENT)
Dept: PHYSICAL THERAPY | Facility: MEDICAL CENTER | Age: 65
End: 2020-05-18
Payer: OTHER MISCELLANEOUS

## 2020-05-18 ENCOUNTER — OFFICE VISIT (OUTPATIENT)
Dept: OBGYN CLINIC | Facility: MEDICAL CENTER | Age: 65
End: 2020-05-18

## 2020-05-18 VITALS
DIASTOLIC BLOOD PRESSURE: 78 MMHG | TEMPERATURE: 97.8 F | HEART RATE: 73 BPM | HEIGHT: 69 IN | BODY MASS INDEX: 33.33 KG/M2 | WEIGHT: 225 LBS | SYSTOLIC BLOOD PRESSURE: 128 MMHG

## 2020-05-18 DIAGNOSIS — S46.211D RUPTURE OF RIGHT PROXIMAL BICEPS TENDON, SUBSEQUENT ENCOUNTER: Primary | ICD-10-CM

## 2020-05-18 DIAGNOSIS — S46.211D RUPTURE OF RIGHT BICEPS TENDON, SUBSEQUENT ENCOUNTER: Primary | ICD-10-CM

## 2020-05-18 PROCEDURE — 97112 NEUROMUSCULAR REEDUCATION: CPT | Performed by: PHYSICAL THERAPIST

## 2020-05-18 PROCEDURE — 97530 THERAPEUTIC ACTIVITIES: CPT | Performed by: PHYSICAL THERAPIST

## 2020-05-18 PROCEDURE — 97110 THERAPEUTIC EXERCISES: CPT | Performed by: PHYSICAL THERAPIST

## 2020-05-18 PROCEDURE — 97140 MANUAL THERAPY 1/> REGIONS: CPT | Performed by: PHYSICAL THERAPIST

## 2020-05-18 PROCEDURE — 99024 POSTOP FOLLOW-UP VISIT: CPT | Performed by: ORTHOPAEDIC SURGERY

## 2020-05-22 ENCOUNTER — APPOINTMENT (OUTPATIENT)
Dept: PHYSICAL THERAPY | Facility: MEDICAL CENTER | Age: 65
End: 2020-05-22
Payer: OTHER MISCELLANEOUS

## 2020-05-26 ENCOUNTER — OFFICE VISIT (OUTPATIENT)
Dept: FAMILY MEDICINE CLINIC | Facility: CLINIC | Age: 65
End: 2020-05-26

## 2020-05-26 VITALS
HEART RATE: 66 BPM | HEIGHT: 69 IN | BODY MASS INDEX: 33.77 KG/M2 | DIASTOLIC BLOOD PRESSURE: 80 MMHG | OXYGEN SATURATION: 97 % | SYSTOLIC BLOOD PRESSURE: 128 MMHG | WEIGHT: 228 LBS | TEMPERATURE: 97.7 F

## 2020-05-26 DIAGNOSIS — Z02.4 ENCOUNTER FOR CDL (COMMERCIAL DRIVING LICENSE) EXAM: Primary | ICD-10-CM

## 2020-05-26 PROCEDURE — 99499 UNLISTED E&M SERVICE: CPT | Performed by: FAMILY MEDICINE

## 2020-05-26 PROCEDURE — 1160F RVW MEDS BY RX/DR IN RCRD: CPT | Performed by: FAMILY MEDICINE

## 2020-05-26 PROCEDURE — 3008F BODY MASS INDEX DOCD: CPT | Performed by: FAMILY MEDICINE

## 2020-06-08 ENCOUNTER — OFFICE VISIT (OUTPATIENT)
Dept: OBGYN CLINIC | Facility: MEDICAL CENTER | Age: 65
End: 2020-06-08
Payer: OTHER MISCELLANEOUS

## 2020-06-08 VITALS
HEIGHT: 69 IN | DIASTOLIC BLOOD PRESSURE: 78 MMHG | BODY MASS INDEX: 33.18 KG/M2 | SYSTOLIC BLOOD PRESSURE: 122 MMHG | TEMPERATURE: 96 F | WEIGHT: 224 LBS | HEART RATE: 66 BPM | RESPIRATION RATE: 18 BRPM

## 2020-06-08 DIAGNOSIS — S46.211D RUPTURE OF RIGHT PROXIMAL BICEPS TENDON, SUBSEQUENT ENCOUNTER: Primary | ICD-10-CM

## 2020-06-08 PROCEDURE — 99213 OFFICE O/P EST LOW 20 MIN: CPT | Performed by: ORTHOPAEDIC SURGERY

## 2020-06-08 PROCEDURE — 3008F BODY MASS INDEX DOCD: CPT | Performed by: ORTHOPAEDIC SURGERY

## 2020-06-08 PROCEDURE — 1036F TOBACCO NON-USER: CPT | Performed by: ORTHOPAEDIC SURGERY

## 2020-06-08 PROCEDURE — 1160F RVW MEDS BY RX/DR IN RCRD: CPT | Performed by: ORTHOPAEDIC SURGERY

## 2020-09-24 ENCOUNTER — OFFICE VISIT (OUTPATIENT)
Dept: OBGYN CLINIC | Facility: MEDICAL CENTER | Age: 65
End: 2020-09-24
Payer: OTHER MISCELLANEOUS

## 2020-09-24 VITALS
WEIGHT: 224 LBS | TEMPERATURE: 98.2 F | HEIGHT: 69 IN | BODY MASS INDEX: 33.18 KG/M2 | DIASTOLIC BLOOD PRESSURE: 86 MMHG | HEART RATE: 59 BPM | SYSTOLIC BLOOD PRESSURE: 132 MMHG

## 2020-09-24 DIAGNOSIS — S46.211D RUPTURE OF RIGHT PROXIMAL BICEPS TENDON, SUBSEQUENT ENCOUNTER: Primary | ICD-10-CM

## 2020-09-24 PROCEDURE — 99213 OFFICE O/P EST LOW 20 MIN: CPT | Performed by: ORTHOPAEDIC SURGERY

## 2020-09-24 NOTE — PROGRESS NOTES
Ortho Sports Medicine Shoulder Follow Up Visit     Assesment:   76 y o  male right shoulder 7 months Surgical Arthroscopy of the right shoulder with open biceps tendonesis DOS: 2/18/2020, doing well at this time    Plan:    Conservative treatment:    Let pain guide return to activities  May return to all activities with no limitations     Can follow up with Dr Krystal Duckworth in the future for Trigger Finger if needed    Imaging: All imaging from today was reviewed by myself and explained to the patient  Injection:    No Injection planned at this time  Surgery:     No surgery is recommended at this point, continue with conservative treatment plan as noted  Follow up:    No follow-ups on file  Chief Complaint   Patient presents with    Right Shoulder - Follow-up     History of Present Illness: The patient is returns for follow up of right shoulder with open biceps tenodesis date of surgery 02/18/2020  Since the prior visit, He reports significant improvement  Patient states that he has been able to get back to all of the things he would like to do at work as far as lifting and shoveling  He has also been able to climb ladders  Pain is improved by rest, ice, NSAIDS and physical therapy  Pain is aggravated by reaching back  Symptoms include popping  The patient denies weakness  The patient has tried rest, ice, NSAIDS and physical therapy          Shoulder Surgical History:  None    Past Medical, Social and Family History:  Past Medical History:   Diagnosis Date    Anxiety     Arthritis     Chronic kidney disease     stage 2    Chronic pain disorder     Hyperlipidemia     Hypertension     Kidney stone     Renal disorder      Past Surgical History:   Procedure Laterality Date    CARPAL TUNNEL RELEASE      COLONOSCOPY      CYSTOSCOPY      HERNIA REPAIR      KNEE ARTHROSCOPY      RI REPAIR BICEPS LONG TENDON Right 2/18/2020    Procedure: TENODESIS BICEPS OPEN PROXIMAL;  Surgeon: Vivienne Benedict DO Neda;  Location:  MAIN OR;  Service: Orthopedics    IN SHLDR ARTHROSCOP,PART DEBRIDE Right 2/18/2020    Procedure: RIGHT SHOULDER ARTHROSCOPY;  Surgeon: Malcom Paredes DO;  Location: [facility] MAIN OR;  Service: Orthopedics    TONSILLECTOMY       Allergies   Allergen Reactions    Statins Other (See Comments)     Rhabdomyolysis; Vision changes    Nsaids      Due to kidney      Current Outpatient Medications on File Prior to Visit   Medication Sig Dispense Refill    aspirin 81 mg chewable tablet Chew 81 mg daily      lisinopril (ZESTRIL) 20 mg tablet Take 20 mg by mouth daily      nystatin (MYCOSTATIN) powder Apply topically 4 (four) times a day 15 g 0    Omega-3 Fatty Acids (FISH OIL) 1,000 mg Take 1,000 mg by mouth 2 (two) times a day      tamsulosin (FLOMAX) 0 4 mg Take 0 4 mg by mouth daily with dinner      ezetimibe (ZETIA) 10 mg tablet Take 10 mg by mouth daily at bedtime        No current facility-administered medications on file prior to visit        Social History     Socioeconomic History    Marital status: Single     Spouse name: Not on file    Number of children: Not on file    Years of education: Not on file    Highest education level: Not on file   Occupational History    Not on file   Social Needs    Financial resource strain: Not on file    Food insecurity     Worry: Not on file     Inability: Not on file    Transportation needs     Medical: Not on file     Non-medical: Not on file   Tobacco Use    Smoking status: Former Smoker    Smokeless tobacco: Never Used   Substance and Sexual Activity    Alcohol use: Yes     Comment: occas    Drug use: No    Sexual activity: Not on file   Lifestyle    Physical activity     Days per week: Not on file     Minutes per session: Not on file    Stress: Not on file   Relationships    Social connections     Talks on phone: Not on file     Gets together: Not on file     Attends Confucianist service: Not on file     Active member of club or organization: Not on file     Attends meetings of clubs or organizations: Not on file     Relationship status: Not on file    Intimate partner violence     Fear of current or ex partner: Not on file     Emotionally abused: Not on file     Physically abused: Not on file     Forced sexual activity: Not on file   Other Topics Concern    Not on file   Social History Narrative    Not on file     I have reviewed the past medical, surgical, social and family history, medications and allergies as documented in the EMR  Review of systems: ROS is negative other than that noted in the HPI  Constitutional: Negative for fatigue and fever  Physical Exam:    Temperature 98 2 °F (36 8 °C), height 5' 9" (1 753 m), weight 102 kg (224 lb)      General/Constitutional: NAD, well developed, well nourished  HENT: Normocephalic, atraumatic  CV: Intact distal pulses, regular rate  Resp: No respiratory distress or labored breathing  Lymphatic: No lymphadenopathy palpated  Neuro: Alert and Oriented x 3, no focal deficits  Psych: Normal mood, normal affect, normal judgement, normal behavior  Skin: Warm, dry, no rashes, no erythema      Shoulder focused exam:    RIGHT LEFT    Scapula Atrophy Negative Negative     Winging Negative Negative     Protraction Negative Negative    Rotator cuff SS 5/5 5/5     IS 5/5 5/5     SubS 5/5 5/5    ROM     170     ER0 60 60     ER90 90    90     IR90 T6    T6     IRb T6    T6    TTP: AC Negative Negative     Biceps Negative Negative     Coracoid Negative Negative    Special Tests: O'Briens Negative Negative     Jacobo-shear Negative Negative     Cross body Adduction Negative Negative     Speeds  Negative Negative     Tuan's Negative Negative     Whipple Negative Negative       Neer Negative Negative     Rowe Negative Negative    Instability: Apprehension & relocation not tested not tested     Load & shift not tested not tested    Other: Crank Negative Negative             UE NV Exam: +2 Radial pulses bilaterally  Sensation intact to light touch C5-T1 bilaterally, Radial/median/ulnar nerve motor intact    Cervical ROM is full without pain, numbness or tingling    Shoulder Imaging    No imaging was performed today    Scribe Attestation    I,:   Rosibel Villanueva am acting as a scribe while in the presence of the attending physician :        I,:   Lawrence Red, DO personally performed the services described in this documentation    as scribed in my presence :

## 2020-12-03 ENCOUNTER — OFFICE VISIT (OUTPATIENT)
Dept: OBGYN CLINIC | Facility: MEDICAL CENTER | Age: 65
End: 2020-12-03
Payer: OTHER MISCELLANEOUS

## 2020-12-03 VITALS — WEIGHT: 224 LBS | TEMPERATURE: 97 F | BODY MASS INDEX: 33.18 KG/M2 | HEIGHT: 69 IN

## 2020-12-03 DIAGNOSIS — M65.30 TRIGGER FINGER OF LEFT HAND, UNSPECIFIED FINGER: ICD-10-CM

## 2020-12-03 DIAGNOSIS — S46.211A BICEPS STRAIN, RIGHT, INITIAL ENCOUNTER: Primary | ICD-10-CM

## 2020-12-03 PROCEDURE — 99213 OFFICE O/P EST LOW 20 MIN: CPT | Performed by: ORTHOPAEDIC SURGERY

## 2020-12-05 ENCOUNTER — APPOINTMENT (OUTPATIENT)
Dept: URGENT CARE | Facility: MEDICAL CENTER | Age: 65
End: 2020-12-05

## 2020-12-10 ENCOUNTER — OCCMED (OUTPATIENT)
Dept: URGENT CARE | Facility: MEDICAL CENTER | Age: 65
End: 2020-12-10
Payer: OTHER MISCELLANEOUS
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
## 2020-12-10 PROCEDURE — 99213 OFFICE O/P EST LOW 20 MIN: CPT | Performed by: PHYSICIAN ASSISTANT

## 2020-12-14 ENCOUNTER — APPOINTMENT (OUTPATIENT)
Dept: URGENT CARE | Facility: MEDICAL CENTER | Age: 65
End: 2020-12-14
Payer: OTHER MISCELLANEOUS

## 2020-12-14 PROCEDURE — 99213 OFFICE O/P EST LOW 20 MIN: CPT | Performed by: PHYSICIAN ASSISTANT

## 2021-01-28 ENCOUNTER — OFFICE VISIT (OUTPATIENT)
Dept: FAMILY MEDICINE CLINIC | Facility: CLINIC | Age: 66
End: 2021-01-28
Payer: OTHER MISCELLANEOUS

## 2021-01-28 VITALS
DIASTOLIC BLOOD PRESSURE: 88 MMHG | SYSTOLIC BLOOD PRESSURE: 124 MMHG | WEIGHT: 233 LBS | BODY MASS INDEX: 34.51 KG/M2 | HEART RATE: 74 BPM | HEIGHT: 69 IN

## 2021-01-28 DIAGNOSIS — Z12.12 SCREENING FOR COLORECTAL CANCER: ICD-10-CM

## 2021-01-28 DIAGNOSIS — S76.211S INGUINAL STRAIN, RIGHT, SEQUELA: Primary | ICD-10-CM

## 2021-01-28 DIAGNOSIS — Z23 ENCOUNTER FOR IMMUNIZATION: ICD-10-CM

## 2021-01-28 DIAGNOSIS — Z12.11 SCREENING FOR COLORECTAL CANCER: ICD-10-CM

## 2021-01-28 PROCEDURE — 99214 OFFICE O/P EST MOD 30 MIN: CPT | Performed by: FAMILY MEDICINE

## 2021-01-28 RX ORDER — TAMSULOSIN HYDROCHLORIDE 0.4 MG/1
0.4 CAPSULE ORAL
COMMUNITY
Start: 2020-08-28

## 2021-01-28 RX ORDER — AMLODIPINE BESYLATE 5 MG/1
5 TABLET ORAL DAILY
COMMUNITY
Start: 2020-10-28

## 2021-01-28 RX ORDER — OMEGA-3-ACID ETHYL ESTERS 1 G/1
CAPSULE, LIQUID FILLED ORAL
COMMUNITY
Start: 2020-11-24

## 2021-01-28 RX ORDER — LISINOPRIL AND HYDROCHLOROTHIAZIDE 25; 20 MG/1; MG/1
1 TABLET ORAL DAILY
COMMUNITY
Start: 2020-08-13

## 2021-01-28 RX ORDER — AMLODIPINE BESYLATE 5 MG/1
TABLET ORAL
COMMUNITY
Start: 2020-10-28

## 2021-01-28 RX ORDER — EZETIMIBE 10 MG/1
10 TABLET ORAL DAILY
COMMUNITY
Start: 2020-11-02 | End: 2021-11-02

## 2021-01-28 RX ORDER — LISINOPRIL AND HYDROCHLOROTHIAZIDE 25; 20 MG/1; MG/1
1 TABLET ORAL DAILY
COMMUNITY
Start: 2020-11-09

## 2021-01-28 NOTE — PROGRESS NOTES
Assessment/Plan:  I believe he did some stretching to the inguinal ligament and this will take some time to heal            Diagnoses and all orders for this visit:    Inguinal strain, right, sequela    Screening for colorectal cancer    Encounter for immunization    Other orders  -     Cancel: Cologuard; Future  -     amLODIPine (NORVASC) 5 mg tablet  -     amLODIPine (NORVASC) 5 mg tablet; Take 5 mg by mouth daily  -     lisinopril-hydrochlorothiazide (PRINZIDE,ZESTORETIC) 20-25 MG per tablet; Take 1 tablet by mouth daily  -     lisinopril-hydrochlorothiazide (PRINZIDE,ZESTORETIC) 20-25 MG per tablet; Take 1 tablet by mouth daily  -     omega-3-acid ethyl esters (LOVAZA) 1 g capsule  -     ezetimibe (ZETIA) 10 mg tablet; Take 10 mg by mouth daily  -     tamsulosin (FLOMAX) 0 4 mg; Take 0 4 mg by mouth            Subjective:        Patient ID: Lindsey Koenig is a 78 y o  male  About 2 months ago on the day before Thanksgiving he was working on some heavy equipment and felt a strain  in his right groin muscle  The following portions of the patient's history were reviewed and updated as appropriate: allergies, current medications, past family history, past medical history, past social history, past surgical history and problem list       Review of Systems   Constitutional: Negative  HENT: Negative  Eyes: Negative  Respiratory: Negative  Cardiovascular: Negative  Gastrointestinal: Negative  Endocrine: Negative  Genitourinary: Negative  Musculoskeletal: Negative  As per HPI  Skin: Negative  Allergic/Immunologic: Negative  Neurological: Negative  Hematological: Negative  Psychiatric/Behavioral: Negative  All other systems reviewed and are negative  Objective:             /88   Pulse 74   Ht 5' 9" (1 753 m)   Wt 106 kg (233 lb)   BMI 34 41 kg/m²          Physical Exam  Vitals signs and nursing note reviewed     Constitutional: Appearance: He is well-developed  HENT:      Head: Normocephalic and atraumatic  Right Ear: External ear normal       Left Ear: External ear normal       Nose: Nose normal    Eyes:      Conjunctiva/sclera: Conjunctivae normal       Pupils: Pupils are equal, round, and reactive to light  Neck:      Musculoskeletal: Normal range of motion and neck supple  Cardiovascular:      Rate and Rhythm: Normal rate and regular rhythm  Heart sounds: Normal heart sounds  Pulmonary:      Effort: Pulmonary effort is normal       Breath sounds: Normal breath sounds  Abdominal:      General: Bowel sounds are normal       Palpations: Abdomen is soft  Hernia: There is no hernia in the left inguinal area or right inguinal area  Genitourinary:     Pubic Area: No rash  Penis: Normal        Scrotum/Testes: Normal          Right: Mass not present  Left: Mass not present  Epididymis:      Right: Normal       Left: Normal    Musculoskeletal: Normal range of motion  Lymphadenopathy:      Lower Body: No right inguinal adenopathy  No left inguinal adenopathy  Skin:     General: Skin is warm and dry  Neurological:      Mental Status: He is alert and oriented to person, place, and time  Deep Tendon Reflexes: Reflexes are normal and symmetric     Psychiatric:         Behavior: Behavior normal

## 2021-04-20 ENCOUNTER — HOSPITAL ENCOUNTER (EMERGENCY)
Facility: HOSPITAL | Age: 66
Discharge: HOME | End: 2021-04-20
Attending: EMERGENCY MEDICINE
Payer: OTHER MISCELLANEOUS

## 2021-04-20 ENCOUNTER — APPOINTMENT (EMERGENCY)
Dept: RADIOLOGY | Facility: HOSPITAL | Age: 66
End: 2021-04-20
Payer: OTHER MISCELLANEOUS

## 2021-04-20 VITALS
SYSTOLIC BLOOD PRESSURE: 151 MMHG | TEMPERATURE: 98.5 F | HEART RATE: 70 BPM | RESPIRATION RATE: 18 BRPM | OXYGEN SATURATION: 96 % | DIASTOLIC BLOOD PRESSURE: 81 MMHG

## 2021-04-20 DIAGNOSIS — S83.91XA RIGHT KNEE SPRAIN: ICD-10-CM

## 2021-04-20 PROCEDURE — 99284 EMERGENCY DEPT VISIT MOD MDM: CPT | Performed by: PHYSICIAN ASSISTANT

## 2021-04-20 PROCEDURE — 99283 EMERGENCY DEPT VISIT LOW MDM: CPT

## 2021-04-20 PROCEDURE — 73564 X-RAY EXAM KNEE 4 OR MORE: CPT

## 2021-04-20 NOTE — Clinical Note
Fatemeh Pierre was seen and treated in our emergency department on 4/20/2021  Diagnosis:     Leno Has    He may return on this date: 04/22/2021         If you have any questions or concerns, please don't hesitate to call        Michelle Costa PA-C    ______________________________           _______________          _______________  Hospital Representative                              Date                                Time

## 2021-04-20 NOTE — ED PROVIDER NOTES
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 Taking?    Omega-3 Fatty Acids (FISH OIL) 1,000 mg   Yes Yes   Sig: Take 1,000 mg by mouth 2 (two) times a day   amLODIPine (NORVASC) 5 mg tablet Not Taking at Unknown time  Yes No   amLODIPine (NORVASC) 5 mg tablet Not Taking at Unknown time  Yes No   Sig: Take 5 mg by mouth daily   aspirin 81 mg chewable tablet   Yes Yes   Sig: Chew 81 mg daily   ezetimibe (ZETIA) 10 mg tablet   Yes No   Sig: Take 10 mg by mouth daily at bedtime    ezetimibe (ZETIA) 10 mg tablet   Yes Yes   Sig: Take 10 mg by mouth daily   lisinopril (ZESTRIL) 20 mg tablet Not Taking at Unknown time  Yes No   Sig: Take 20 mg by mouth daily   lisinopril-hydrochlorothiazide (PRINZIDE,ZESTORETIC) 20-25 MG per tablet   Yes Yes   Sig: Take 1 tablet by mouth daily   lisinopril-hydrochlorothiazide (PRINZIDE,ZESTORETIC) 20-25 MG per tablet Not Taking at Unknown time  Yes No   Sig: Take 1 tablet by mouth daily   nystatin (MYCOSTATIN) powder Not Taking at Unknown time  No No   Sig: Apply topically 4 (four) times a day   Patient not taking: Reported on 4/20/2021   omega-3-acid ethyl esters (LOVAZA) 1 g capsule Not Taking at Unknown time  Yes No   tamsulosin (FLOMAX) 0 4 mg   Yes Yes   Sig: Take 0 4 mg by mouth daily with dinner   tamsulosin (FLOMAX) 0 4 mg Not Taking at Unknown time  Yes No   Sig: Take 0 4 mg by mouth      Facility-Administered Medications: None       Past Medical History:   Diagnosis Date    Anxiety     Arthritis     Chronic kidney disease     stage 2    Chronic pain disorder     Hyperlipidemia     Hypertension     Kidney stone     Renal disorder        Past Surgical History:   Procedure Laterality Date    CARPAL TUNNEL RELEASE      COLONOSCOPY      CYSTOSCOPY      HERNIA REPAIR      KNEE ARTHROSCOPY      HI REPAIR BICEPS LONG TENDON Right 2/18/2020    Procedure: TENODESIS BICEPS OPEN PROXIMAL;  Surgeon: Kaylee Mackenzie DO;  Location: [address] [place] [address];  Service: Orthopedics    HI SURGICAL ARTHROSCOPY SHOULDER LMTD DBRDMT 1/2 Right 2/18/2020    Procedure: RIGHT SHOULDER ARTHROSCOPY;  Surgeon: Kaylee Mackenzie DO;  Location: [address] [place] [address];  Service: Orthopedics    TONSILLECTOMY         Family History   Problem Relation Age of Onset    Heart disease Father      I have reviewed and agree with the history as documented  E-Cigarette/Vaping    E-Cigarette Use Never User      E-Cigarette/Vaping Substances     Social History     Tobacco Use    Smoking status: Former Smoker    Smokeless tobacco: Never Used   Substance Use Topics    Alcohol use: Yes     Comment: occas    Drug use: No       Review of Systems   Respiratory: Negative  Cardiovascular: Negative  Neurological: Negative  All other systems reviewed and are negative  Physical Exam  Physical Exam  Vitals signs and nursing note reviewed  Constitutional:       Appearance: He is well-developed  HENT:      Head: Normocephalic and atraumatic  Right Ear: External ear normal       Left Ear: External ear normal    Eyes:      Conjunctiva/sclera: Conjunctivae normal    Neck:      Musculoskeletal: Normal range of motion and neck supple  Cardiovascular:      Rate and Rhythm: Normal rate and regular rhythm  Heart sounds: Normal heart sounds     Pulmonary:      Effort: Pulmonary effort is normal       Breath sounds: Normal breath sounds  Abdominal:      General: Bowel sounds are normal       Palpations: Abdomen is soft  Musculoskeletal:         General: No swelling or deformity  Right knee: He exhibits bony tenderness  He exhibits normal range of motion, no effusion, no laceration and no erythema  Tenderness found  Medial joint line tenderness noted  Right lower leg: No edema  Left lower leg: No edema  Legs:       Comments: No calf tenderness or swelling  Mild pain with ROM of his knee  Lymphadenopathy:      Cervical: No cervical adenopathy  Skin:     General: Skin is warm  Findings: No rash  Neurological:      Mental Status: He is alert and oriented to person, place, and time  Motor: No abnormal muscle tone  Coordination: Coordination normal    Psychiatric:         Behavior: Behavior normal          Vital Signs  ED Triage Vitals [04/20/21 1045]   Temperature Pulse Respirations Blood Pressure SpO2   98 5 °F (36 9 °C) 70 18 151/81 96 %      Temp Source Heart Rate Source Patient Position - Orthostatic VS BP Location FiO2 (%)   Oral Monitor Sitting Right arm --      Pain Score       --           Vitals:    04/20/21 1045   BP: 151/81   Pulse: 70   Patient Position - Orthostatic VS: Sitting         Visual Acuity      ED Medications  Medications - No data to display    Diagnostic Studies  Results Reviewed     None                 XR knee 4+ views RIGHT   ED Interpretation by Michelle Costa PA-C (04/20 6718)   No acute fracture - there is a bony cystic structure medially       Final Result by Jacqueline Cuevas MD (04/20 3878)      No acute osseous abnormality              Workstation performed: [id number]                    Procedures  Procedures         ED Course                             SBIRT 22yo+      Most Recent Value   SBIRT (22 yo +)   In order to provide better care to our patients, we are screening all of our patients for alcohol and drug use  Would it be okay to ask you these screening questions? Yes Filed at: 04/20/2021 1206   Initial Alcohol Screen: US AUDIT-C    1  How often do you have a drink containing alcohol?  0 Filed at: 04/20/2021 1206   2  How many drinks containing alcohol do you have on a typical day you are drinking? 0 Filed at: 04/20/2021 1206   3a  Male UNDER 65: How often do you have five or more drinks on one occasion? 0 Filed at: 04/20/2021 1206   3b  FEMALE Any Age, or MALE 65+: How often do you have 4 or more drinks on one occassion? 0 Filed at: 04/20/2021 1206   Audit-C Score  0 Filed at: 04/20/2021 1206   JONAS: How many times in the past year have you    Used an illegal drug or used a prescription medication for non-medical reasons? Never Filed at: 04/20/2021 1206                    MDM  Number of Diagnoses or Management Options  Right knee sprain: new and requires workup     Amount and/or Complexity of Data Reviewed  Independent visualization of images, tracings, or specimens: yes    Risk of Complications, Morbidity, and/or Mortality  General comments: Ambulating well with ace wrap to right knee- ace wrap applied by myself NV intact  Instructions reviewed  Offered crutches - pt declined  Called pt with xray read and instructions reviewed  Patient Progress  Patient progress: improved      Disposition  Final diagnoses:   Right knee sprain     Time reflects when diagnosis was documented in both MDM as applicable and the Disposition within this note     Time User Action Codes Description Comment    4/20/2021 12:38 PM Michelle Costa Add [place] Je[place] Right knee sprain     4/20/2021 12:39 PM Michelle Costa Modify [place] Jeison Right knee sprain       ED Disposition     ED Disposition Condition Date/Time Comment    Discharge Stable Tue Apr 20, 2021 12:38 PM Gallo Ibanez discharge to home/self care              Follow-up Information     Follow up With Specialties Details Why Contact Info Additional Information Rufina Jeff MD Family Medicine   [address] [facility] [employer]  [address] [place] [id number]  [address] [facility] [place] [facility]   [address] [address] [phone] [address] [facility] [place], [address], [address], [facility] [place], [place], [id number] [phone]          Discharge Medication List as of 4/20/2021 12:39 PM      CONTINUE these medications which have NOT CHANGED    Details   aspirin 81 mg chewable tablet Chew 81 mg daily, Historical Med      ezetimibe (ZETIA) 10 mg tablet Take 10 mg by mouth daily, Starting Mon 11/2/2020, Until Tue 11/2/2021, Historical Med      !! lisinopril-hydrochlorothiazide (PRINZIDE,ZESTORETIC) 20-25 MG per tablet Take 1 tablet by mouth daily, Starting Thu 8/13/2020, Historical Med      Omega-3 Fatty Acids (FISH OIL) 1,000 mg Take 1,000 mg by mouth 2 (two) times a day, Historical Med      !! tamsulosin (FLOMAX) 0 4 mg Take 0 4 mg by mouth daily with dinner, Historical Med      !! amLODIPine (NORVASC) 5 mg tablet Starting Wed 10/28/2020, Historical Med      !! amLODIPine (NORVASC) 5 mg tablet Take 5 mg by mouth daily, Starting Wed 10/28/2020, Historical Med      lisinopril (ZESTRIL) 20 mg tablet Take 20 mg by mouth daily, Historical Med      !! lisinopril-hydrochlorothiazide (PRINZIDE,ZESTORETIC) 20-25 MG per tablet Take 1 tablet by mouth daily, Starting Mon 11/9/2020, Historical Med      nystatin (MYCOSTATIN) powder Apply topically 4 (four) times a day, Starting Mon 3/2/2020, Normal      omega-3-acid ethyl esters (LOVAZA) 1 g capsule Starting Tue 11/24/2020, Historical Med      !! tamsulosin (FLOMAX) 0 4 mg Take 0 4 mg by mouth, Starting Fri 8/28/2020, Historical Med       !! - Potential duplicate medications found  Please discuss with provider              PDMP Review       Value Time User    PDMP Reviewed  Yes 2/18/2020  9:49 AM Quynh Raines PA-C ED Provider  Electronically Signed by           Gerardo Cochran PA-C  04/20/21 1934

## 2021-06-04 ENCOUNTER — HOSPITAL ENCOUNTER (EMERGENCY)
Facility: HOSPITAL | Age: 66
Discharge: HOME | End: 2021-06-04
Attending: EMERGENCY MEDICINE
Payer: OTHER MISCELLANEOUS

## 2021-06-04 VITALS
TEMPERATURE: 97.9 F | WEIGHT: 227.96 LBS | OXYGEN SATURATION: 98 % | RESPIRATION RATE: 18 BRPM | BODY MASS INDEX: 33.66 KG/M2 | HEART RATE: 62 BPM | SYSTOLIC BLOOD PRESSURE: 176 MMHG | DIASTOLIC BLOOD PRESSURE: 82 MMHG

## 2021-06-04 DIAGNOSIS — G89.29 CHRONIC PAIN OF RIGHT KNEE: Primary | ICD-10-CM

## 2021-06-04 DIAGNOSIS — M25.561 CHRONIC PAIN OF RIGHT KNEE: Primary | ICD-10-CM

## 2021-06-04 PROCEDURE — 99282 EMERGENCY DEPT VISIT SF MDM: CPT | Performed by: STUDENT IN AN ORGANIZED HEALTH CARE EDUCATION/TRAINING PROGRAM

## 2021-06-04 PROCEDURE — 99283 EMERGENCY DEPT VISIT LOW MDM: CPT

## 2021-06-04 NOTE — DISCHARGE INSTRUCTIONS
Please follow up with your PCP to ensure resolution of symptoms  Please return to the ED with any new or worsening symptoms

## 2021-06-04 NOTE — ED PROVIDER NOTES
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  Pt had XR of his right knee where no acute osseous abnormality was seen  Since this injury he reports an episodic burning sensation to the medial aspect of his right knee that does not radiate  He states he was seen by his PCP who prescribed a gel, but this has provided minimal relief  He states activity and squatting make it worse but denies palliative factors  He denies swelling, ecchymosis, numbness/tingling, stiffness, weakness, and loss of ROM  He states he is not in any current pain and denies repeat injury to the area  He denies any other complaints at this time  History provided by:  Patient   used: No    Knee Pain  Location:  Knee  Time since incident:  8 weeks  Knee location:  R knee  Pain details:     Quality:  Burning    Radiates to:  Does not radiate    Severity:  No pain    Onset quality:  Gradual    Duration:  8 weeks    Timing:  Intermittent  Chronicity:  Chronic  Dislocation: no    Foreign body present:  No foreign bodies  Prior injury to area:  Yes  Relieved by:  Nothing  Worsened by:  Bearing weight and activity  Ineffective treatments:  Compression  Associated symptoms: no back pain, no decreased ROM, no fever, no muscle weakness, no stiffness, no swelling and no tingling        Prior to Admission Medications   Prescriptions Last Dose Informant Patient Reported? Taking?    Omega-3 Fatty Acids (FISH OIL) 1,000 mg   Yes No   Sig: Take 1,000 mg by mouth 2 (two) times a day   amLODIPine (NORVASC) 5 mg tablet   Yes No   amLODIPine (NORVASC) 5 mg tablet   Yes No   Sig: Take 5 mg by mouth daily aspirin 81 mg chewable tablet   Yes No   Sig: Chew 81 mg daily   ezetimibe (ZETIA) 10 mg tablet   Yes No   Sig: Take 10 mg by mouth daily at bedtime    ezetimibe (ZETIA) 10 mg tablet   Yes No   Sig: Take 10 mg by mouth daily   lisinopril (ZESTRIL) 20 mg tablet   Yes No   Sig: Take 20 mg by mouth daily   lisinopril-hydrochlorothiazide (PRINZIDE,ZESTORETIC) 20-25 MG per tablet   Yes No   Sig: Take 1 tablet by mouth daily   lisinopril-hydrochlorothiazide (PRINZIDE,ZESTORETIC) 20-25 MG per tablet   Yes No   Sig: Take 1 tablet by mouth daily   nystatin (MYCOSTATIN) powder   No No   Sig: Apply topically 4 (four) times a day   Patient not taking: Reported on 4/20/2021   omega-3-acid ethyl esters (LOVAZA) 1 g capsule   Yes No   tamsulosin (FLOMAX) 0 4 mg   Yes No   Sig: Take 0 4 mg by mouth daily with dinner   tamsulosin (FLOMAX) 0 4 mg   Yes No   Sig: Take 0 4 mg by mouth      Facility-Administered Medications: None       Past Medical History:   Diagnosis Date    Anxiety     Arthritis     Chronic kidney disease     stage 2    Chronic pain disorder     Hyperlipidemia     Hypertension     Kidney stone     Renal disorder        Past Surgical History:   Procedure Laterality Date    CARPAL TUNNEL RELEASE      COLONOSCOPY      CYSTOSCOPY      HERNIA REPAIR      KNEE ARTHROSCOPY      ND REPAIR BICEPS LONG TENDON Right 2/18/2020    Procedure: TENODESIS BICEPS OPEN PROXIMAL;  Surgeon: Marcos Rangel DO;  Location:  MAIN OR;  Service: Orthopedics    ND SURGICAL ARTHROSCOPY SHOULDER LMTD DBRDMT 1/2 Right 2/18/2020    Procedure: RIGHT SHOULDER ARTHROSCOPY;  Surgeon: Marcos Rangel DO;  Location:  MAIN OR;  Service: Orthopedics    TONSILLECTOMY         Family History   Problem Relation Age of Onset    Heart disease Father      I have reviewed and agree with the history as documented      E-Cigarette/Vaping    E-Cigarette Use Never User      E-Cigarette/Vaping Substances     Social History     Tobacco Use    Smoking status: Former Smoker    Smokeless tobacco: Never Used   Substance Use Topics    Alcohol use: Yes     Comment: occas    Drug use: No       Review of Systems   Constitutional: Negative for chills and fever  HENT: Negative for ear pain and sore throat  Eyes: Negative for pain and visual disturbance  Respiratory: Negative for cough and shortness of breath  Cardiovascular: Negative for chest pain and palpitations  Gastrointestinal: Negative for abdominal pain and vomiting  Genitourinary: Negative for dysuria and hematuria  Musculoskeletal: Positive for arthralgias  Negative for back pain and stiffness  Skin: Negative for color change and rash  Neurological: Negative for seizures and syncope  All other systems reviewed and are negative  Physical Exam  Physical Exam  Vitals signs and nursing note reviewed  Constitutional:       Appearance: He is well-developed  HENT:      Head: Normocephalic and atraumatic  Eyes:      Conjunctiva/sclera: Conjunctivae normal    Neck:      Musculoskeletal: Neck supple  Cardiovascular:      Rate and Rhythm: Normal rate and regular rhythm  Heart sounds: No murmur  Pulmonary:      Effort: Pulmonary effort is normal  No respiratory distress  Breath sounds: Normal breath sounds  No wheezing, rhonchi or rales  Abdominal:      Palpations: Abdomen is soft  Tenderness: There is no abdominal tenderness  Musculoskeletal: Normal range of motion  Comments: Full ROM of his right knee with no pain  DP and PT 1+ bilaterally  Mild TTP along medical aspect of right knee  No joint line tenderness  Patellar reflex intact bilaterally  Skin:     General: Skin is warm and dry  Neurological:      General: No focal deficit present  Mental Status: He is alert           Vital Signs  ED Triage Vitals   Temperature Pulse Respirations Blood Pressure SpO2   06/04/21 1128 06/04/21 1127 06/04/21 1127 06/04/21 1127 06/04/21 1127   97 9 °F (36 6 °C) 62 18 (!) 176/82 98 %      Temp Source Heart Rate Source Patient Position - Orthostatic VS BP Location FiO2 (%)   06/04/21 1128 06/04/21 1127 06/04/21 1127 06/04/21 1127 --   Oral Monitor Sitting Right arm       Pain Score       06/04/21 1127       No Pain           Vitals:    06/04/21 1127   BP: (!) 176/82   Pulse: 62   Patient Position - Orthostatic VS: Sitting         Visual Acuity      ED Medications  Medications - No data to display    Diagnostic Studies  Results Reviewed     None                 No orders to display              Procedures  Procedures         ED Course                                           MDM  Number of Diagnoses or Management Options  Chronic pain of right knee: new and does not require workup  Diagnosis management comments: Opal Montejo is a 78year old male with CKD, HTN, and HLD who presents to the ED with episodic burning of the medial aspect of his right knee after sustaining a work related injury in 4/2021  XR done 4/20/21 showed no acute osseous abnormality  Right knee showed full ROM w/o pain, no swelling, ecchymosis, and N/V intact  Patient concerned about ligament tear, advised to f/u with PCP and ortho outpatient  Advised to use Tylenol PRN for pain d/t CKD  Patient verbalized understanding and agreement with the management plan  Strict ED return instructions were discussed at bedside and all questions were answered  Prior to discharge, I provided both verbal and written instructions of the management plan and the signs and symptoms that should prompt the patient to return to the ED  All questions were answered and the patient was comfortable with the plan of care and discharged home  The patient agrees to return to the Emergency Department for concerns and/or progression of illness        Patient Progress  Patient progress: stable      Disposition  Final diagnoses:   None     ED Disposition     None      Follow-up Information    None         Patient's Medications   Discharge Prescriptions    No medications on file     No discharge procedures on file      PDMP Review       Value Time User    PDMP Reviewed  Yes 2/18/2020  9:49 AM Martina Mart PA-C          ED Provider  Electronically Signed by           Aleks Phipps PA-C  06/04/21 2849

## 2021-06-08 ENCOUNTER — OFFICE VISIT (OUTPATIENT)
Dept: OBGYN CLINIC | Facility: MEDICAL CENTER | Age: 66
End: 2021-06-08
Payer: OTHER MISCELLANEOUS

## 2021-06-08 VITALS
SYSTOLIC BLOOD PRESSURE: 148 MMHG | HEART RATE: 59 BPM | DIASTOLIC BLOOD PRESSURE: 84 MMHG | RESPIRATION RATE: 18 BRPM | BODY MASS INDEX: 33.62 KG/M2 | WEIGHT: 227 LBS | HEIGHT: 69 IN

## 2021-06-08 DIAGNOSIS — M25.561 ACUTE PAIN OF RIGHT KNEE: ICD-10-CM

## 2021-06-08 DIAGNOSIS — M17.11 ARTHRITIS OF RIGHT KNEE: Primary | ICD-10-CM

## 2021-06-08 PROCEDURE — 99213 OFFICE O/P EST LOW 20 MIN: CPT | Performed by: ORTHOPAEDIC SURGERY

## 2021-06-08 NOTE — PROGRESS NOTES
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  He states he is not having any knee pain prior to this  He localizes his pain medially and describes this as sharp with instability and burning  Today he rates his pain and a 1/10 although this does change intermittently  He has been treating this with topical anti-inflammatory cream which is helping  This is affecting his work, hobbies and activities of daily living  His pain wakes him at night  Also taking Tylenol for pain  ALLERGIES:  Allergies   Allergen Reactions    Statins Other (See Comments)     Rhabdomyolysis;  Vision changes    Nsaids      Due to kidney        CURRENT MEDICATIONS:  Current Outpatient Medications   Medication Sig Dispense Refill    aspirin 81 mg chewable tablet Chew 81 mg daily      ezetimibe (ZETIA) 10 mg tablet Take 10 mg by mouth daily      lisinopril-hydrochlorothiazide (PRINZIDE,ZESTORETIC) 20-25 MG per tablet Take 1 tablet by mouth daily      Omega-3 Fatty Acids (FISH OIL) 1,000 mg Take 1,000 mg by mouth 2 (two) times a day      omega-3-acid ethyl esters (LOVAZA) 1 g capsule       tamsulosin (FLOMAX) 0 4 mg Take 0 4 mg by mouth daily with dinner      amLODIPine (NORVASC) 5 mg tablet       amLODIPine (NORVASC) 5 mg tablet Take 5 mg by mouth daily      ezetimibe (ZETIA) 10 mg tablet Take 10 mg by mouth daily at bedtime       lisinopril (ZESTRIL) 20 mg tablet Take 20 mg by mouth daily      lisinopril-hydrochlorothiazide (PRINZIDE,ZESTORETIC) 20-25 MG per tablet Take 1 tablet by mouth daily      nystatin (MYCOSTATIN) powder Apply topically 4 (four) times a day (Patient not taking: Reported on 4/20/2021) 15 g 0    tamsulosin (FLOMAX) 0 4 mg Take 0 4 mg by mouth       No current facility-administered medications for this visit          PAST MEDICAL HISTORY  Past Medical History:   Diagnosis Date    Anxiety     Arthritis     Chronic kidney disease     stage 2    Chronic pain disorder     Hyperlipidemia     Hypertension     Kidney stone     Renal disorder        SURGICAL HISTORY  Past Surgical History:   Procedure Laterality Date    CARPAL TUNNEL RELEASE      COLONOSCOPY      CYSTOSCOPY      HERNIA REPAIR      KNEE ARTHROSCOPY      UT REPAIR BICEPS LONG TENDON Right 2/18/2020    Procedure: TENODESIS BICEPS OPEN PROXIMAL;  Surgeon: Leroy Castellano DO;  Location: [facility] MAIN OR;  Service: Orthopedics    UT SURGICAL ARTHROSCOPY SHOULDER LMTD DBRDMT 1/2 Right 2/18/2020    Procedure: RIGHT SHOULDER ARTHROSCOPY;  Surgeon: Leroy Castellano DO;  Location: [facility] MAIN OR;  Service: Orthopedics    TONSILLECTOMY         FAMILY HISTORY  Family History   Problem Relation Age of Onset    Heart disease Father        SOCIAL HISTORY  Social History     Socioeconomic History    Marital status: Single     Spouse name: Not on file    Number of children: Not on file    Years of education: Not on file    Highest education level: Not on file   Occupational History    Not on file   Social Needs    Financial resource strain: Not on file    Food insecurity     Worry: Not on file     Inability: Not on file    Transportation needs     Medical: Not on file     Non-medical: Not on file   Tobacco Use    Smoking status: Former Smoker    Smokeless tobacco: Never Used   Substance and Sexual Activity    Alcohol use: Yes     Comment: occas    Drug use: No    Sexual activity: Not on file   Lifestyle    Physical activity     Days per week: Not on file     Minutes per session: Not on file    Stress: Not on file   Relationships    Social connections     Talks on phone: Not on file     Gets together: Not on file     Attends Mu-ism service: Not on file     Active member of club or organization: Not on file Attends meetings of clubs or organizations: Not on file     Relationship status: Not on file    Intimate partner violence     Fear of current or ex partner: Not on file     Emotionally abused: Not on file     Physically abused: Not on file     Forced sexual activity: Not on file   Other Topics Concern    Not on file   Social History Narrative    Not on file         Physical Exam    Vitals  Vitals:    06/08/21 0828   BP: 148/84   Pulse: 59   Resp: 18       BMI  Body mass index is 33 52 kg/m²  GENERAL: No acute distress  Alert and oriented  Well nourished and well hydrated  Appears stated age  HEENT : Normocephalic, atraumatic  Extraocular movements intact  Mask in place  NECK: Supple, trachea midline  LUNGS: Adequate and symmetric respiratory effort  No intercostal retractions or accessory muscle use  HEART: Extremities warm and perfused  ABDOMEN: Nondistended  SKIN: Warm and dry, no rash  Right Knee   Inspection/Appearance:       Swelling: No      Patella is midline  Alignment:  Knee is in neutral  Palpation - Soft Tissue: normal without effusion  ROM:       Extension - 0          Flexion - 120      extensor lag: no    Stability:  demonstrates no varus, valgus, anterior drawer or posterior drawer  Patella: stable, tracks normally  Sensation Intact to Light Touch in Sural, Saphenous, Tibial, Superficial Peroneal, and Deep Peroneal Nerve Distribution  Motor function 5 out of 5 strength in Tibialis Anterior, Gastrocnemius, Soleus, Extensor Hallucis Longus, and Flexor Hallucis Longus Muscles  Extremity Warm and Well Perfused  Brisk Capillary Refill in Toes           Imaging  A) Imaging modality available  Radiographs: yes  MRI scan: no  CT scan: no    B) Imaging findings  Subchondral cysts: no  Subchondral sclerosis: no  Periarticular osteophytes: yes  Joint subluxation: no  Joint space narrowing: yes  Bone-on-bone articulations: no  Avascular necrosis: no      Assessment and Plan  Right Knee Arthritis    - - discussed with patient that symptoms are consistent with arthritis flare or possible meniscal tear/injury  The initial treatment pathway for either injury is CSI and PT  We also reviewed general guidelines for knee OA as summarized below  Knee Pain / Arthritis Treatment Recommendations    Strengthening Exercises  10 repetitions each leg Monday, Wednesday, Friday OR Tuesday, Thursday, Saturday  Increase 10 repetitions per week  1  Straight leg raises (SLR)  2  VMO Modification SLR - (Rotate hip out externally) Do if SLR becomes easy    Exercise - 5 minutes per day Monday, Wednesday, Friday OR Tuesday, Thursday, Saturday  Increase 5 minutes per day per week  May use bike (non-impact) or walk (impact) or swim or alternate  Goal is to get up to at least 30 minutes per day  1  Bicycle  2  Walking    Weight loss   Goal to lose 1 pound per week  Portion control, limit sweets, limit carbs    Medications  Anti-inflammatories (NSAIDs)  1  Ibuprofen (Motrin or Advil) 600 mg (3 pills) with food 3 times a day for 3 - 7 days  OR  2  Naprosyn (Aleve) 1 - 2 pills twice a day with food for 3 - 7 days  Stop if you develop upset stomach or bleeding occurs  We discussed that scheduled NSAIDs for greater than 1 week should be discussed with PCP to monitor for potential side effects  Pain reliever  1  Acetaminophen (Tylenol) 2 pills (regular or extra-strength) 3 times a day for 3 - 7 days    Taken together (Acetaminophen with one of the NSAIDs (ibuprofen OR naprosyn)), the combination may work better than either one alone for more acute pain    Other  Braces, wraps, ice, heat, topical ointments may all be used/tried if they help pain/symptoms    Patietn would liek to hold off on CSI at this time  We will place order for PT  Justina Holstein Sammy Moritz, MD  Adult Reconstruction Surgery  Department of [place] [address]  9:17 AM

## 2021-06-17 ENCOUNTER — EVALUATION (OUTPATIENT)
Dept: PHYSICAL THERAPY | Facility: REHABILITATION | Age: 66
End: 2021-06-17
Payer: OTHER MISCELLANEOUS

## 2021-06-17 DIAGNOSIS — M17.11 ARTHRITIS OF RIGHT KNEE: ICD-10-CM

## 2021-06-17 PROCEDURE — 97162 PT EVAL MOD COMPLEX 30 MIN: CPT | Performed by: PHYSICAL MEDICINE & REHABILITATION

## 2021-06-17 NOTE — PROGRESS NOTES
PT Evaluation     Today's date: 2021  Patient name: Charissa Noguera  : 1955  MRN: [id number]  Referring provider: Tami Knapp MD  Dx:   Encounter Diagnosis     ICD-10-CM    1  Arthritis of right knee  M17 11 Ambulatory referral to Physical Therapy     Ambulatory referral to Physical Therapy       Start Time: 1730  Stop Time:   Total time in clinic (min): 45 minutes    Assessment  Assessment details: Pt is a 78 y o  male presenting to outpatient physical therapy with Arthritis of right knee   Pt presents with pain, decreased range of motion, decreased strength, and decreased tolerance to activity  Tests and measures consistent with possible meniscus pathology  Pt would benefit from skilled physical therapy to address limitations and to achieve goals  Thank you for this referral    Impairments: abnormal gait, abnormal or restricted ROM, impaired balance, impaired physical strength, lacks appropriate home exercise program and pain with function    Symptom irritability: moderateUnderstanding of Dx/Px/POC: fair   Prognosis: fair    Goals  ST  Patient will report 25% decrease in pain in 4 weeks  2  Patient will demonstrate 25% improvement in ROM in 4 weeks  3  Patient will demonstrate 1/2 grade improvement in strength in 4 weeks  LT  Patient will be able to perform IADLS without restriction or pain by discharge  2  Patient will be independent in HEP by discharge  3  Patient will be able to return to recreational/work duties without restriction or pain by discharge        Plan  Patient would benefit from: PT eval and skilled physical therapy  Planned modality interventions: biofeedback, cryotherapy and thermotherapy: hydrocollator packs  Planned therapy interventions: abdominal trunk stabilization, joint mobilization, manual therapy, neuromuscular re-education, patient education, postural training, strengthening, stretching, therapeutic activities, therapeutic exercise, home exercise program, gait training, balance and balance/weight bearing training  Frequency: 2x week  Duration in weeks: 4  Treatment plan discussed with: patient        Subjective Evaluation    History of Present Illness  Mechanism of injury: Pt reports onset of sxs in April  He states that he was kneeling when he felt "something move in his knee " He then felt more pain while walking up a hill shortly after  His sxs are reported in the medial aspect of the R knee, described as burning and stinging  Hx of arthroscopic knee surgery in , "clean up the meniscus " 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#, the hose can weigh up to 150# and he must pull through varying surfaces (grass, stairs, etc)  Pt denies catching or locking of the knee  Pain  Current pain ratin  At worst pain ratin  Progression: no change    Patient Goals  Patient goals for therapy: decreased pain          Objective     Active Range of Motion   Left Knee   Flexion: 125 degrees   Extension: 0 degrees     Right Knee   Flexion: 115 degrees   Extension: 0 degrees     Tests     Right Knee   Positive medial Ion, Thessaly's test at 20 degrees and varus stress test at 30 degrees  Negative Thessaly's test at 5 degrees and varus stress test at 0 degrees         Flowsheet Rows      Most Recent Value   PT/OT G-Codes   Current Score  62   Projected Score  68             Precautions: OA, anxiety,       Manuals             IASTM (VMO, med knee)                                                    Neuro Re-Ed                                                                                                        Ther Ex             Bike             Prone quad stretch              pball wall squat             Leg press              Leg ext             [place] walk out (belt @ waist)             Eccentric step down             pball bridge + curl                          Ther Activity                                       Gait Training                                       Modalities

## 2021-06-24 ENCOUNTER — OFFICE VISIT (OUTPATIENT)
Dept: PHYSICAL THERAPY | Facility: REHABILITATION | Age: 66
End: 2021-06-24
Payer: OTHER MISCELLANEOUS

## 2021-06-24 DIAGNOSIS — M17.11 ARTHRITIS OF RIGHT KNEE: Primary | ICD-10-CM

## 2021-06-24 PROCEDURE — 97110 THERAPEUTIC EXERCISES: CPT | Performed by: PHYSICAL MEDICINE & REHABILITATION

## 2021-06-24 PROCEDURE — 97140 MANUAL THERAPY 1/> REGIONS: CPT | Performed by: PHYSICAL MEDICINE & REHABILITATION

## 2021-06-24 NOTE — PROGRESS NOTES
Daily Note     Today's date: 2021  Patient name: Seamus Galeano  : 1955  MRN: [id number]  Referring provider: Rayna Ahuja MD  Dx:   Encounter Diagnosis     ICD-10-CM    1  Arthritis of right knee  M17 11        Start Time:   Stop Time: 180  Total time in clinic (min): 43 minutes    Subjective: Pt states that he has been feeling better since beginning HEP   Objective: See treatment diary below      Assessment: Tolerated treatment well  Patient demonstrated fatigue post treatment, exhibited good technique with therapeutic exercises and would benefit from continued PT  Plan: Progress treatment as tolerated         Precautions: OA, anxiety,       Manuals             IASTM (VMO, med knee) NC                                                   Neuro Re-Ed                                                                                                        Ther Ex             Bike 5'            Prone quad stretch  10 x 10"            pball wall squat             Leg press  88#  3 x 8            Leg ext 33#  3 x 8            [place] walk out (belt @ waist)             Eccentric step down             pball bridge + curl 10                          Ther Activity                                       Gait Training                                       Modalities

## 2021-06-30 ENCOUNTER — OFFICE VISIT (OUTPATIENT)
Dept: PHYSICAL THERAPY | Facility: REHABILITATION | Age: 66
End: 2021-06-30
Payer: OTHER MISCELLANEOUS

## 2021-06-30 DIAGNOSIS — M17.11 ARTHRITIS OF RIGHT KNEE: Primary | ICD-10-CM

## 2021-06-30 PROCEDURE — 97140 MANUAL THERAPY 1/> REGIONS: CPT

## 2021-06-30 PROCEDURE — 97110 THERAPEUTIC EXERCISES: CPT

## 2021-06-30 NOTE — PROGRESS NOTES
Daily Note     Today's date: 2021  Patient name: Ayanna Dominguez  : 1955  MRN: [id number]  Referring provider: Fay Gordon MD  Dx:   Encounter Diagnosis     ICD-10-CM    1  Arthritis of right knee  M17 11                   Subjective: pt reports increased burning sensation in medial aspect of knee following last visit  He reported pain continued through the weekend, but feel better at present time  Objective: See treatment diary below      Assessment: Tolerated treatment well and without significant complaints  He reported decreased pain post exercises  Moderate soft tissue restrictions in VMO with IASTM; tolerated well  Patient demonstrated fatigue post treatment, exhibited good technique with therapeutic exercises and would benefit from continued PT  Issued/reviwed HEP; no questions or concerns expressed  Plan: Continue per plan of care  Progress treatment as tolerated         Precautions: OA, anxiety,       Manuals            IASTM (VMO, med knee) NC TE                                                  Neuro Re-Ed                                                                                                        Ther Ex             Bike 5' 10'           Prone quad stretch  10 x 10" 30"x3           pball wall squat             Leg press  88#  3 x 8 88# 2x10           Leg ext 33#  3 x 8 33# 2x10           [place] walk out (belt @ waist)             Eccentric step down             pball bridge + curl 10  x10                        Ther Activity                                       Gait Training                                       Modalities

## 2021-07-07 ENCOUNTER — OFFICE VISIT (OUTPATIENT)
Dept: PHYSICAL THERAPY | Facility: REHABILITATION | Age: 66
End: 2021-07-07
Payer: OTHER MISCELLANEOUS

## 2021-07-07 DIAGNOSIS — M17.11 ARTHRITIS OF RIGHT KNEE: Primary | ICD-10-CM

## 2021-07-07 PROCEDURE — 97110 THERAPEUTIC EXERCISES: CPT

## 2021-07-07 PROCEDURE — 97140 MANUAL THERAPY 1/> REGIONS: CPT

## 2021-07-07 PROCEDURE — 97010 HOT OR COLD PACKS THERAPY: CPT

## 2021-07-07 NOTE — PROGRESS NOTES
Daily Note     Today's date: 2021  Patient name: Radha Schwartz  : 1955  MRN: [id number]  Referring provider: David Valdez MD  Dx:   Encounter Diagnosis     ICD-10-CM    1  Arthritis of right knee  M17 11                   Subjective:  Patient reports that he felt good the night of his last visit but then he did the quad stretch on his side which really aggravated his knee  Patient reports that he had pain for a day two  Patient notes that he has random pain every day  Objective: See treatment diary below      Assessment: Tolerated treatment well  + medial knee/adductor/vmo tightness noted with patient reported tenderness  Light IASTM performed within patient tolerance  Reviewed HEP and technique for performing prone quad stretch as the patient noted pain with stretching at home  Patient demonstrated a good understanding  Patient reported feeling good with some mild anterior medial discomfort upon standing which  with ambulation  Patient demonstrated fatigue post treatment, exhibited good technique with therapeutic exercises and would benefit from continued PT to attain set goals  Plan: Continue per plan of care        Precautions: OA, anxiety,       Manuals           IASTM (VMO, med knee) NC TE CC                                                 Neuro Re-Ed                                                                                                        Ther Ex             Bike 5' 10' x10'          Prone quad stretch  10 x 10" 30"x3 30"x3          pball wall squat   10x 3"          Leg press  88#  3 x 8 88# 2x10 110# 2x10          Leg ext 33#  3 x 8 33# 2x10 33# 2x10          [place] walk out (belt @ waist)             Eccentric step down             pball bridge + curl 10  x10 x15                       Ther Activity                                       Gait Training                                       Modalities             Cold pack   10'

## 2021-07-12 ENCOUNTER — TELEPHONE (OUTPATIENT)
Dept: OBGYN CLINIC | Facility: MEDICAL CENTER | Age: 66
End: 2021-07-12

## 2021-07-12 NOTE — TELEPHONE ENCOUNTER
Patient sees Dr Kuldeep Hughes    Patient needs to know whats the CPT  Codes for the cortisone injection for his right knee       Pt was given these for price check [de-identified]       [phone]

## 2021-07-14 ENCOUNTER — APPOINTMENT (OUTPATIENT)
Dept: PHYSICAL THERAPY | Facility: REHABILITATION | Age: 66
End: 2021-07-14
Payer: OTHER MISCELLANEOUS

## 2021-12-02 ENCOUNTER — OFFICE VISIT (OUTPATIENT)
Dept: OBGYN CLINIC | Facility: MEDICAL CENTER | Age: 66
End: 2021-12-02
Payer: OTHER MISCELLANEOUS

## 2021-12-02 ENCOUNTER — TELEPHONE (OUTPATIENT)
Dept: OBGYN CLINIC | Facility: HOSPITAL | Age: 66
End: 2021-12-02

## 2021-12-02 VITALS
DIASTOLIC BLOOD PRESSURE: 87 MMHG | BODY MASS INDEX: 35.34 KG/M2 | HEART RATE: 58 BPM | WEIGHT: 238.6 LBS | HEIGHT: 69 IN | SYSTOLIC BLOOD PRESSURE: 147 MMHG

## 2021-12-02 DIAGNOSIS — M17.12 OSTEOARTHRITIS OF LEFT KNEE, UNSPECIFIED OSTEOARTHRITIS TYPE: Primary | ICD-10-CM

## 2021-12-02 DIAGNOSIS — M17.11 ARTHRITIS OF RIGHT KNEE: ICD-10-CM

## 2021-12-02 PROCEDURE — 99214 OFFICE O/P EST MOD 30 MIN: CPT | Performed by: ORTHOPAEDIC SURGERY

## 2021-12-02 PROCEDURE — 20610 DRAIN/INJ JOINT/BURSA W/O US: CPT | Performed by: ORTHOPAEDIC SURGERY

## 2021-12-02 RX ORDER — LIDOCAINE HYDROCHLORIDE 10 MG/ML
3 INJECTION, SOLUTION INFILTRATION; PERINEURAL
Status: COMPLETED | OUTPATIENT
Start: 2021-12-02 | End: 2021-12-02

## 2021-12-02 RX ORDER — METHYLPREDNISOLONE ACETATE 40 MG/ML
1 INJECTION, SUSPENSION INTRA-ARTICULAR; INTRALESIONAL; INTRAMUSCULAR; SOFT TISSUE
Status: COMPLETED | OUTPATIENT
Start: 2021-12-02 | End: 2021-12-02

## 2021-12-02 RX ADMIN — METHYLPREDNISOLONE ACETATE 1 ML: 40 INJECTION, SUSPENSION INTRA-ARTICULAR; INTRALESIONAL; INTRAMUSCULAR; SOFT TISSUE at 10:31

## 2021-12-02 RX ADMIN — LIDOCAINE HYDROCHLORIDE 3 ML: 10 INJECTION, SOLUTION INFILTRATION; PERINEURAL at 10:31

## 2021-12-10 ENCOUNTER — EVALUATION (OUTPATIENT)
Dept: PHYSICAL THERAPY | Facility: MEDICAL CENTER | Age: 66
End: 2021-12-10
Payer: OTHER MISCELLANEOUS

## 2021-12-10 DIAGNOSIS — M17.12 OSTEOARTHRITIS OF LEFT KNEE, UNSPECIFIED OSTEOARTHRITIS TYPE: ICD-10-CM

## 2021-12-10 DIAGNOSIS — M17.11 ARTHRITIS OF RIGHT KNEE: ICD-10-CM

## 2021-12-10 PROCEDURE — 97161 PT EVAL LOW COMPLEX 20 MIN: CPT | Performed by: PHYSICAL THERAPIST

## 2022-04-21 ENCOUNTER — OFFICE VISIT (OUTPATIENT)
Dept: FAMILY MEDICINE CLINIC | Facility: CLINIC | Age: 67
End: 2022-04-21

## 2022-04-21 VITALS
SYSTOLIC BLOOD PRESSURE: 126 MMHG | BODY MASS INDEX: 33.33 KG/M2 | HEIGHT: 69 IN | DIASTOLIC BLOOD PRESSURE: 84 MMHG | HEART RATE: 55 BPM | WEIGHT: 225 LBS

## 2022-04-21 DIAGNOSIS — Z02.4 ENCOUNTER FOR CDL (COMMERCIAL DRIVING LICENSE) EXAM: Primary | ICD-10-CM

## 2022-04-21 PROCEDURE — 99499 UNLISTED E&M SERVICE: CPT | Performed by: FAMILY MEDICINE

## 2022-10-03 ENCOUNTER — OFFICE VISIT (OUTPATIENT)
Dept: OBGYN CLINIC | Facility: MEDICAL CENTER | Age: 67
End: 2022-10-03
Payer: COMMERCIAL

## 2022-10-03 VITALS — WEIGHT: 225 LBS | BODY MASS INDEX: 33.33 KG/M2 | HEIGHT: 69 IN

## 2022-10-03 DIAGNOSIS — M17.12 PRIMARY OSTEOARTHRITIS OF LEFT KNEE: Primary | ICD-10-CM

## 2022-10-03 PROCEDURE — 99213 OFFICE O/P EST LOW 20 MIN: CPT | Performed by: ORTHOPAEDIC SURGERY

## 2022-10-03 PROCEDURE — 20610 DRAIN/INJ JOINT/BURSA W/O US: CPT | Performed by: ORTHOPAEDIC SURGERY

## 2022-10-03 RX ORDER — LISINOPRIL 10 MG/1
TABLET ORAL
COMMUNITY
Start: 2022-08-17

## 2022-10-03 RX ORDER — AMOXICILLIN 500 MG/1
CAPSULE ORAL
COMMUNITY

## 2022-10-03 RX ORDER — TRIAMCINOLONE ACETONIDE 40 MG/ML
40 INJECTION, SUSPENSION INTRA-ARTICULAR; INTRAMUSCULAR
Status: COMPLETED | OUTPATIENT
Start: 2022-10-03 | End: 2022-10-03

## 2022-10-03 RX ORDER — CLOPIDOGREL BISULFATE 75 MG/1
75 TABLET ORAL DAILY
COMMUNITY
Start: 2022-06-16 | End: 2023-06-16

## 2022-10-03 RX ORDER — EVOLOCUMAB 140 MG/ML
INJECTION, SOLUTION SUBCUTANEOUS
COMMUNITY
Start: 2022-04-19

## 2022-10-03 RX ADMIN — TRIAMCINOLONE ACETONIDE 40 MG: 40 INJECTION, SUSPENSION INTRA-ARTICULAR; INTRAMUSCULAR at 16:00

## 2022-10-03 NOTE — PROGRESS NOTES
Ortho Sports Medicine Knee Visit     Assesment:   left knee moderate to severe medial compartment arthritis     Plan:    Conservative treatment:    Ice to knee for 20 minutes at least 1-2 times daily  OTC NSAIDS prn for pain  Given repeat CSI in left knee today, discussed if no significant improvement we would then refer to joint specialist to discuss total knee arthroplasty  Repeat CSI in 3-4 months if needed  Imaging: All imaging from today was reviewed by myself and explained to the patient  Injection:    The risks and benefits of the injection (which include but are not limited to: infection, bleeding,damage to nerve/artery, need for further intervention), as well as the risks and benefits of all alternative treatments were explained and understood  The patient elected to proceed with injection  The procedure was done with aseptic technique, and the patient tolerated the procedure well with no complications  A corticosteroid injection was performed in left knee  The patient should take 1-2 days off of activity, with gradual return to activity as tolerated  Ice to the knee 1-2 times daily for 20 minutes, for next 24-48 hrs  Surgery:     No surgery is recommended at this point, continue with conservative treatment plan as noted  History of Present Illness: The patient is returns for follow up of his left knee  He does have history a/pmm  He was given CSI 12/2021 for OA and possible meniscal re tear  He then [facility] Dr Lita Runner and was also given CSI  He was told he likely needs TKA  Pain is located medial      Pain is improved by rest, ice, NSAIDS and injection  Pain is aggravated by weight bearing  Symptoms include clicking and cracking  The patient has tried rest, ice, NSAIDS, physical therapy and injection  I have reviewed the past medical, surgical, social and family history, medications and allergies as documented in the EMR      Review of systems: ROS is negative other than that noted in the HPI  Constitutional: Negative for fatigue and fever  Cardiovascular: Negative for chest pain  Pulmonary: negative for shortness of breath    PMH/PSH:  Past Medical History:   Diagnosis Date    Anxiety     Arthritis     Chronic kidney disease     stage 2    Chronic pain disorder     Hyperlipidemia     Hypertension     Kidney stone     Renal disorder      Past Surgical History:   Procedure Laterality Date    CARPAL TUNNEL RELEASE      COLONOSCOPY      CYSTOSCOPY      HAND SURGERY Left 10/2021    trigger finger release - left hand middle finger    HERNIA REPAIR      KNEE ARTHROSCOPY      [facility] REPAIR BICEPS LONG TENDON Right 2/18/2020    Procedure: TENODESIS BICEPS OPEN PROXIMAL;  Surgeon: Aman Batista DO;  Location: [address] [place] [address];  Service: Orthopedics    NH SURGICAL ARTHROSCOPY SHOULDER LMTD DBRDMT 1/2 Right 2/18/2020    Procedure: RIGHT SHOULDER ARTHROSCOPY;  Surgeon: Aman Batista DO;  Location: [facility];  Service: Orthopedics    TONSILLECTOMY          Physical Exam:    Height 5' 9" (1 753 m), weight 102 kg (225 lb)  General/Constitutional: NAD, well developed, well nourished  HENT: Normocephalic, atraumatic  CV: Intact distal pulses, regular rate  Resp: No respiratory distress or labored breathing  Lymphatic: No lymphadenopathy palpated  Neuro: Alert and Oriented x 3, no focal deficits  Psych: Normal mood, normal affect, normal judgement, normal behavior  Skin: Warm, dry, no rashes, no erythema       Knee Exam (focused):                   RIGHT LEFT   ROM:   0-130 0-130   Palpation: Effusion negative negative     MJL tenderness Negative Positive     LJL tenderness Negative Negative   Instability: Varus stable stable     Valgus stable stable   Special Tests: [place]achman Negative Negative     Posterior drawer Negative Negative     Anterior drawer Negative Negative     Pivot shift not tested not tested     Dial not tested not tested   Patella: Palpation no tenderness no tenderness     Mobility 1/4 1/4     Apprehension Negative Negative   Other: Single leg 1/4 squat not tested not tested      LE NV Exam: +2 DP/PT pulses bilaterally  Sensation intact to light touch L2-S1 bilaterally    No calf tenderness to palpation bilaterally      Knee Imaging    X-rays of the left knee were reviewed outside imaging, which demonstrate moderate arthritis medial compartment with osteophyte  I have reviewed the radiology report and agree with their impression  Large joint arthrocentesis: L knee  Universal Protocol:  Consent: Verbal consent obtained  Risks and benefits: risks, benefits and alternatives were discussed  Consent given by: patient  Time out: Immediately prior to procedure a "time out" was called to verify the correct patient, procedure, equipment, support staff and site/side marked as required    Timeout called at: 10/3/2022 3:59 PM   Patient understanding: patient states understanding of the procedure being performed  Site marked: the operative site was marked  Patient identity confirmed: verbally with patient    Supporting Documentation  Indications: pain   Procedure Details  Location: knee - L knee  Preparation: Patient was prepped and draped in the usual sterile fashion  Needle size: 22 G  Ultrasound guidance: no  Approach: anterolateral  Medications administered: 40 mg triamcinolone acetonide 40 mg/mL (2ML 0 5% naropin injeciton intra articular )    Patient tolerance: patient tolerated the procedure well with no immediate complications  Dressing:  Sterile dressing applied          Scribe Attestation    I,:  Mendy Moser am acting as a scribe while in the presence of the attending physician :       I,:  Cristina Red DO personally performed the services described in this documentation    as scribed in my presence :

## 2023-05-08 ENCOUNTER — OFFICE VISIT (OUTPATIENT)
Dept: OBGYN CLINIC | Facility: MEDICAL CENTER | Age: 68
End: 2023-05-08

## 2023-05-08 VITALS
DIASTOLIC BLOOD PRESSURE: 76 MMHG | WEIGHT: 230 LBS | HEART RATE: 98 BPM | SYSTOLIC BLOOD PRESSURE: 132 MMHG | BODY MASS INDEX: 34.07 KG/M2 | HEIGHT: 69 IN

## 2023-05-08 DIAGNOSIS — M17.12 PRIMARY OSTEOARTHRITIS OF LEFT KNEE: Primary | ICD-10-CM

## 2023-05-08 RX ORDER — MELATONIN
1000 DAILY
COMMUNITY

## 2023-05-08 RX ORDER — BUPIVACAINE HYDROCHLORIDE 2.5 MG/ML
2 INJECTION, SOLUTION INFILTRATION; PERINEURAL
Status: COMPLETED | OUTPATIENT
Start: 2023-05-08 | End: 2023-05-08

## 2023-05-08 RX ORDER — METHYLPREDNISOLONE ACETATE 40 MG/ML
1 INJECTION, SUSPENSION INTRA-ARTICULAR; INTRALESIONAL; INTRAMUSCULAR; SOFT TISSUE
Status: COMPLETED | OUTPATIENT
Start: 2023-05-08 | End: 2023-05-08

## 2023-05-08 RX ADMIN — BUPIVACAINE HYDROCHLORIDE 2 ML: 2.5 INJECTION, SOLUTION INFILTRATION; PERINEURAL at 17:44

## 2023-05-08 RX ADMIN — METHYLPREDNISOLONE ACETATE 1 ML: 40 INJECTION, SUSPENSION INTRA-ARTICULAR; INTRALESIONAL; INTRAMUSCULAR; SOFT TISSUE at 17:44

## 2023-05-08 NOTE — PROGRESS NOTES
Ortho Sports Medicine Knee Visit     Assesment:   left knee moderate to severe medial compartment arthritis     Plan:    Conservative treatment:    Ice to knee for 20 minutes at least 1-2 times daily  OTC NSAIDS prn for pain  Repeat CSI in 3-4 months if needed  Imaging:    No imaging was available for review today  Injection:    The risks and benefits of the injection (which include but are not limited to: infection, bleeding,damage to nerve/artery, need for further intervention), as well as the risks and benefits of all alternative treatments were explained and understood  The patient elected to proceed with injection  The procedure was done with aseptic technique, and the patient tolerated the procedure well with no complications  A corticosteroid injection was performed  The patient should take 1-2 days off of activity, with gradual return to activity as tolerated  Ice to the knee 1-2 times daily for 20 minutes, for next 24-48 hrs  Surgery:     No surgery is recommended at this point, continue with conservative treatment plan as noted  History of Present Illness: The patient is returns for follow up of his left knee moderate to severe OA  CSI in October with benefit  He states injection has worn off  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 knee  Pain is located medial      Pain is improved by rest, ice, NSAIDS and injection  Pain is aggravated by stairs, squatting, weight bearing and walking  Symptoms include clicking and popping  The patient has tried rest, ice, NSAIDS and injection  I have reviewed the past medical, surgical, social and family history, medications and allergies as documented in the EMR  Review of systems: ROS is negative other than that noted in the HPI  Constitutional: Negative for fatigue and fever     Cardiovascular: Negative for chest pain  Pulmonary: "negative for shortness of breath    PMH/PSH:  Past Medical History:   Diagnosis Date   • Anxiety    • Arthritis    • Chronic kidney disease     stage 2   • Chronic pain disorder    • Hyperlipidemia    • Hypertension    • Kidney stone    • Renal disorder      Past Surgical History:   Procedure Laterality Date   • CARPAL TUNNEL RELEASE     • COLONOSCOPY     • CYSTOSCOPY     • HAND SURGERY Left 10/2021    trigger finger release - left hand middle finger   • HERNIA REPAIR     • KNEE ARTHROSCOPY     • SC SURGICAL ARTHROSCOPY SHOULDER LMTD DBRDMT 1/2 Right 2/18/2020    Procedure: RIGHT SHOULDER ARTHROSCOPY;  Surgeon: Seble Eugene DO;  Location: [facility] MAIN OR;  Service: Orthopedics   • SC TENODESIS LONG TENDON BICEPS Right 2/18/2020    Procedure: TENODESIS BICEPS OPEN PROXIMAL;  Surgeon: Seble Eugene DO;  Location:  MAIN OR;  Service: Orthopedics   • TONSILLECTOMY          Physical Exam:    Blood pressure 132/76, pulse 98, height 5' 9\" (1 753 m), weight 104 kg (230 lb)  General/Constitutional: NAD, well developed, well nourished  HENT: Normocephalic, atraumatic  CV: Intact distal pulses, regular rate  Resp: No respiratory distress or labored breathing  Lymphatic: No lymphadenopathy palpated  Neuro: Alert and Oriented x 3, no focal deficits  Psych: Normal mood, normal affect, normal judgement, normal behavior  Skin: Warm, dry, no rashes, no erythema       Knee Exam (focused):                   RIGHT LEFT   ROM:   0-130 0-130   Palpation: Effusion negative negative     MJL tenderness Negative Positive     LJL tenderness Negative Negative   Instability: Varus stable stable     Valgus stable stable   Special Tests: Lachman Negative Negative     Posterior drawer Negative Negative     Anterior drawer Negative Negative     Pivot shift not tested not tested     Dial not tested not tested   Patella: Palpation no tenderness no tenderness     Mobility 1/4 <1/4     Apprehension Negative Negative   Other: Single leg 1/4 squat " not tested not tested      LE NV Exam: +2 DP/PT pulses bilaterally  Sensation intact to light touch L2-S1 bilaterally    No calf tenderness to palpation bilaterally      Knee Imaging    No new imaging to review     Large joint arthrocentesis: L knee  Universal Protocol:  Consent: Verbal consent obtained    Risks and benefits: risks, benefits and alternatives were discussed  Consent given by: patient  Patient understanding: patient states understanding of the procedure being performed  Site marked: the operative site was marked  Patient identity confirmed: verbally with patient    Supporting Documentation  Indications: pain   Procedure Details  Location: knee - L knee  Preparation: Patient was prepped and draped in the usual sterile fashion  Needle size: 22 G  Ultrasound guidance: no  Approach: anterolateral  Medications administered: 2 mL bupivacaine 0 25 %; 1 mL methylPREDNISolone acetate 40 mg/mL    Patient tolerance: patient tolerated the procedure well with no immediate complications  Dressing:  Sterile dressing applied          Scribe Attestation    I,:  Rody Jose am acting as a scribe while in the presence of the attending physician :       I,:  46 Rosita Red DO personally performed the services described in this documentation    as scribed in my presence :

## 2023-05-15 NOTE — ED PROVIDER NOTES
History  Chief Complaint   Patient presents with    Blurred Vision     pt c/o double vision and blurry vision today, states symptoms improve when he takes a really deep breath through his nose  referred to ER by family doctor  pt also c/o leg cramps since going out to dinner, was seen by PCP and had ultrasound in the office to r/o blood clots (pt reports negative)     This is a 58year old male who states that yesterday he noticed that he had some PND and his nose was stuffy and dry  He took an aleve yesterday  He states that he had gone out to dinner and noticed that afterwards he developed leg cramps (so did his wife) so he saw his PCP yesterday at [address] and an ultrasound was done and was negative  He states that he mentioned to his PCP about his stuffy nose and they stated he could have a sinus infection  Pt states that earlier today he developed blurred vision and when he "took a deep breath in the blurred vision cleared up"  He states that he also noted that when he looked to the right he had double vision  He states that he seems to have pressure in the forehead with nasal congestion; he describes as a facial headache  He states that his left eye seems to be uncomfortable  He denies hx of glaucoma, denies eye surgery, retinal detachment, DM  Wears glasses and last saw opth 1 year ago  He denies any head injury  He denies any stroke like symptoms such as slurred speech, weakness  Denies hx of carotid disease  Per EMR [facility] PCP note doppler negative  Pt is a   History provided by:  Patient, spouse and medical records   used: No        Prior to Admission Medications   Prescriptions Last Dose Informant Patient Reported? Taking?    amLODIPine (NORVASC) 10 mg tablet   Yes Yes   Sig: Take 10 mg by mouth daily   aspirin (ECOTRIN LOW STRENGTH) 81 mg EC tablet   Yes Yes   Sig: Take 81 mg by mouth daily   lisinopril (ZESTRIL) 20 mg tablet   Yes Yes   Sig: Take 20 mg by mouth daily   metoprolol tartrate (LOPRESSOR) 25 mg tablet   Yes Yes   Sig: Take 25 mg by mouth every 12 (twelve) hours   rosuvastatin (CRESTOR) 5 mg tablet   Yes Yes   Sig: Take 5 mg by mouth daily   tamsulosin (FLOMAX) 0 4 mg   Yes Yes   Sig: Take 0 4 mg by mouth daily with dinner      Facility-Administered Medications: None       Past Medical History:   Diagnosis Date    Hyperlipidemia     Hypertension     Renal disorder        Past Surgical History:   Procedure Laterality Date    CARPAL TUNNEL RELEASE      HERNIA REPAIR      KNEE ARTHROSCOPY      TONSILLECTOMY         History reviewed  No pertinent family history  I have reviewed and agree with the history as documented  Social History   Substance Use Topics    Smoking status: Former Smoker    Smokeless tobacco: Never Used    Alcohol use Yes      Comment: occas        Review of Systems   Constitutional: Negative  HENT: Positive for congestion and sinus pressure  Eyes: Positive for pain and visual disturbance  Respiratory: Negative  Cardiovascular: Negative  Gastrointestinal: Negative  Endocrine: Negative  Genitourinary: Negative  Musculoskeletal:        B/L leg cramps    Skin: Negative  Allergic/Immunologic: Negative  Neurological: Positive for headaches  Negative for dizziness, syncope, facial asymmetry, speech difficulty and weakness  Hematological: Negative  Psychiatric/Behavioral: Negative          Physical Exam  ED Triage Vitals   Temperature Pulse Respirations Blood Pressure SpO2   01/24/18 1720 01/24/18 1720 01/24/18 1720 01/24/18 1939 01/24/18 1720   97 6 °F (36 4 °C) 62 18 150/83 98 %      Temp Source Heart Rate Source Patient Position - Orthostatic VS BP Location FiO2 (%)   01/24/18 1720 01/24/18 1720 01/24/18 1720 01/24/18 1720 --   Temporal Monitor Sitting Right arm       Pain Score       01/24/18 1720       No Pain           Orthostatic Vital Signs  Vitals:    01/24/18 1720 01/24/18 1939 01/24/18 2234   BP:  150/83 125/66   Pulse: 62 (!) 51 59   Patient Position - Orthostatic VS: Sitting Lying Sitting       Physical Exam   Constitutional: He is oriented to person, place, and time  He appears well-developed and well-nourished  No distress  HENT:   Head: Normocephalic and atraumatic  Right Ear: External ear normal    Left Ear: External ear normal    Nose: Nose normal    Mouth/Throat: Oropharynx is clear and moist  No oropharyngeal exudate  Eyes: Conjunctivae and EOM are normal  Pupils are equal, round, and reactive to light  Right eye exhibits no discharge  Left eye exhibits no discharge  No scleral icterus  Peripheral vision B/L 85 degrees  No field deficit noted with exam   Pt had glasses off for visual exam and when he put them back on he stated that he saw 2 fingers when I was holding 1 up - he states only lasted for a second  Unable to reproduce again  VA done by RN  20/25 left, right and both eyes   No nystagmus     Fundoscopic exam does not reveal anything acute at this time  Blood vessels visualized  Neck: Normal range of motion  Neck supple  No carotid bruits appreciated  Cardiovascular: Normal rate, regular rhythm and normal heart sounds  Pulmonary/Chest: Effort normal and breath sounds normal  No respiratory distress  He has no wheezes  He has no rales  He exhibits no tenderness  Abdominal: Soft  Bowel sounds are normal  He exhibits no distension  There is no tenderness  Musculoskeletal: Normal range of motion  Neurological: He is alert and oriented to person, place, and time  He displays normal reflexes  No cranial nerve deficit or sensory deficit  He exhibits normal muscle tone  Coordination normal    Skin: Skin is warm and dry  Capillary refill takes less than 2 seconds  He is not diaphoretic  Psychiatric: He has a normal mood and affect  His behavior is normal  Judgment and thought content normal    Nursing note and vitals reviewed        ED Medications  Medications   sodium chloride 0 9 % bolus 1,000 mL (0 mL Intravenous Stopped 1/24/18 2257)   sodium chloride 0 9 % bolus 1,000 mL (0 mL Intravenous Stopped 1/24/18 2257)       Diagnostic Studies  Results Reviewed     Procedure Component Value Units Date/Time    CKMB [[id number]]  (Normal) Collected:  01/24/18 2230    Lab Status:  Final result Specimen:  Blood from Arm, Right Updated:  01/24/18 2315     CK-MB Index <1 0 %      CK-MB FRACTION 2 5 ng/mL     CK (with reflex to MB) [[id number]]  (Abnormal) Collected:  01/24/18 2230    Lab Status:  Final result Specimen:  Blood from Arm, Right Updated:  01/24/18 2314     Total  (H) U/L     Urine Microscopic [[id number]]  (Abnormal) Collected:  01/24/18 2145    Lab Status:  Final result Specimen:  Urine from Urine, Clean Catch Updated:  01/24/18 2207     RBC, UA 2-4 (A) /hpf      WBC, UA None Seen /hpf      Epithelial Cells None Seen /hpf      Bacteria, UA None Seen /hpf     POCT urinalysis dipstick [[id number]]  (Normal) Resulted:  01/24/18 2149    Lab Status:  Final result Specimen:  Urine Updated:  01/24/18 2149     Color, UA yellow     Clarity, UA clear    ED Urine Macroscopic [[id number]]  (Abnormal) Collected:  01/24/18 2145    Lab Status:  Final result Specimen:  Urine Updated:  01/24/18 2146     Color, UA Yellow     Clarity, UA Clear     pH, UA 6 0     Leukocytes, UA Negative     Nitrite, UA Negative     Protein, UA Negative mg/dl      Glucose, UA Negative mg/dl      Ketones, UA Negative mg/dl      Urobilinogen, UA 0 2 E U /dl      Bilirubin, UA Negative     Blood, UA Small (A)     Specific [place], UA 1 010    Narrative:       CLINITEK RESULT    CKMB [[id number]]  (Normal) Collected:  01/24/18 2020    Lab Status:  Final result Specimen:  Blood from Arm, Left Updated:  01/24/18 2112     CK-MB Index <1 0 %      CK-MB FRACTION 2 7 ng/mL     Comprehensive metabolic panel [[id number]] Collected:  01/24/18 2020    Lab Status:  Final result Specimen:  Blood from Arm, Left Updated:  01/24/18 2108     Sodium 141 mmol/L      Potassium 3 8 mmol/L      Chloride 103 mmol/L      CO2 28 mmol/L      Anion Gap 10 mmol/L      BUN 13 mg/dL      Creatinine 1 19 mg/dL      Glucose 108 mg/dL      Calcium 8 9 mg/dL      AST 42 U/L      ALT 42 U/L      Alkaline Phosphatase 58 U/L      Total Protein 7 5 g/dL      Albumin 4 3 g/dL      Total Bilirubin 0 41 mg/dL      eGFR 64 ml/min/1 73sq m     Narrative:         National Kidney Disease Education Program recommendations are as follows:  GFR calculation is accurate only with a steady state creatinine  Chronic Kidney disease less than 60 ml/min/1 73 sq  meters  Kidney failure less than 15 ml/min/1 73 sq  meters  Magnesium [[id number]]  (Normal) Collected:  01/24/18 2020    Lab Status:  Final result Specimen:  Blood from Arm, Left Updated:  01/24/18 2108     Magnesium 2 0 mg/dL     CK Total with Reflex CKMB [[id number]]  (Abnormal) Collected:  01/24/18 2020    Lab Status:  Final result Specimen:  Blood from Arm, Left Updated:  01/24/18 2108     Total CK 1,191 (H) U/L     Protime-INR [[id number]]  (Normal) Collected:  01/24/18 2020    Lab Status:  Final result Specimen:  Blood from Arm, Left Updated:  01/24/18 2036     Protime 12 8 seconds      INR 0 96    APTT [[id number]]  (Normal) Collected:  01/24/18 2020    Lab Status:  Final result Specimen:  Blood from Arm, Left Updated:  01/24/18 2036     PTT 31 seconds     Narrative:          Therapeutic Heparin Range = 60-90 seconds    CBC and differential [[id number]]  (Normal) Collected:  01/24/18 2020    Lab Status:  Final result Specimen:  Blood from Arm, Left Updated:  01/24/18 2025     WBC 6 51 Thousand/uL      RBC 5 16 Million/uL      Hemoglobin 14 6 g/dL      Hematocrit 42 4 %      MCV 82 fL      MCH 28 3 pg      MCHC 34 4 g/dL      RDW 13 2 %      MPV 9 8 fL      Platelets 984 Thousands/uL      nRBC 0 /100 WBCs      Neutrophils Relative 55 %      Lymphocytes Relative 33 %      Monocytes Relative 9 % Eosinophils Relative 2 %      Basophils Relative 1 %      Neutrophils Absolute 3 65 Thousands/µL      Lymphocytes Absolute 2 12 Thousands/µL      Monocytes Absolute 0 57 Thousand/µL      Eosinophils Absolute 0 14 Thousand/µL      Basophils Absolute 0 03 Thousands/µL                  CT head without contrast   Final Result by Nasima Russell MD (01/24 2018)      No acute intracranial abnormality  Workstation performed: [id number]                    Procedures  Procedures       Phone Contacts  ED Phone Contact    ED Course  ED Course as of Jan 24 2332 Wed Jan 24, 2018 2026 CT head negative as per radiology read  2117 Labs reviewed and discussed with pt  CT reviewed and discussed with pt as well  CK 1191  Pt started a statin for cholesterol 1 month ago  Will check urine, give IVF x 2 liters  2147 Urine reviewed  No protein  2224 Will check CK to make sure it is decreasing  2327 CK down to 928  I have instructed pt to stop statin and drink water  Pt verbalizes understanding  MDM  Number of Diagnoses or Management Options  Rhabdomyolysis:   Visual disturbance:   Diagnosis management comments: Differential diagnosis:   Sinusitis, CVA, ICH  Visual disturbances secondary to statin use?   Leg cramping - electrolyte imbalance, rhabdomyolysis     Labs  CT head  Visual acuity           Amount and/or Complexity of Data Reviewed  Clinical lab tests: ordered and reviewed  Tests in the radiology section of CPT®: ordered and reviewed  Review and summarize past medical records: yes      CritCare Time    Disposition  Final diagnoses:   Rhabdomyolysis   Visual disturbance     Time reflects when diagnosis was documented in both MDM as applicable and the Disposition within this note     Time User Action Codes Description Comment    1/24/2018  9:24 PM Radha Kaplan [M62 [id number]] Rhabdomyolysis     1/24/2018  9:25 PM Raphael Swift [H53 9] Visual disturbance ED Disposition     ED Disposition Condition Comment    Discharge  Jackalyn Brittle Nesfeder discharge to home/self care  Condition at discharge: Good        Follow-up Information     Follow up With Specialties Details Why Contact Info Additional [address] Sudeep Maguire MD Family Medicine Schedule an appointment as soon as possible for a visit in 1 day  [address] [facility] [address]  [address] [facility] Emergency Department Emergency Medicine  If symptoms worsen [address] [facility]  [phone] [place] ED, [address] [place], [place], [address],  Ophthalmology Schedule an appointment as soon as possible for a visit in 1 day  [address] [facility] [address] [facility]  [phone]       [address] [facility] Emergency Department Emergency Medicine  If symptoms worsen [address] [facility]  [phone] [place] ED, [address] [facility] [address]  , [place], [place], [address]        Patient's Medications   Discharge Prescriptions    No medications on file     No discharge procedures on file  ED Provider  Electronically Signed by        LENNY ULTRASOUND DOPPLER 1/23/18    Impression: No evidence of DVT in left lower extremity  Workstation:[id number]   Result Narrative   History: Pain of left calf [X37 [id number] (ICD-10-CM    B-mode two-dimensional vascular structure, Doppler spectral analysis, and color  flow Doppler imaging of left lower extremity was performed  Comparison: None  Left common femoral, femoral, popliteal and visible calf veins demonstrate  normal compressibility, color Doppler flow and spectral phasicity     Status          Jonnie Mendoza  01/24/18 6119 Size Of Lesion In Cm: 0.3

## 2023-06-05 NOTE — TELEPHONE ENCOUNTER
Caller: Patient    Doctor: KATHY    Reason for call: Patient calling to check if he has any appts today  There was nothing in the system for him  No

## 2023-07-06 NOTE — TELEPHONE ENCOUNTER
Caller: Patient    Doctor: Priscila Higuera    Reason for call:     Patient is calling to speak to the dr about a left knee replacement, stating the injections are not working anymore. He is asking if the Dr would do the replacement or refer to speak to another Dr about the surgery? He is asking for a call back relating surgery.     Call back#: [phone]

## 2023-07-06 NOTE — TELEPHONE ENCOUNTER
Called & spoke to patient. I made him aware that Dr. Juliana Sylvester does not perform total joint replacements and that he treats arthritis conservatively and would refer to the total joint surgeons respectively. Patient is going to call his work comp to find out if they even cover TKA's. He will do this at his leisure and then call back to schedule with one either Dr. Robert Leon or Dr. Pam Pearce. Patient was pleased with the info and had no additional questions.

## 2024-02-21 PROBLEM — Z02.4 ENCOUNTER FOR CDL (COMMERCIAL DRIVING LICENSE) EXAM: Status: RESOLVED | Noted: 2020-05-26 | Resolved: 2024-02-21

## 2024-03-11 ENCOUNTER — OFFICE VISIT (OUTPATIENT)
Dept: FAMILY MEDICINE CLINIC | Facility: CLINIC | Age: 69
End: 2024-03-11

## 2024-03-11 VITALS
DIASTOLIC BLOOD PRESSURE: 82 MMHG | HEART RATE: 63 BPM | SYSTOLIC BLOOD PRESSURE: 148 MMHG | WEIGHT: 214 LBS | BODY MASS INDEX: 32.43 KG/M2 | HEIGHT: 68 IN

## 2024-03-11 DIAGNOSIS — Z02.4 ENCOUNTER FOR CDL (COMMERCIAL DRIVING LICENSE) EXAM: Primary | ICD-10-CM

## 2024-03-11 PROCEDURE — 99499 UNLISTED E&M SERVICE: CPT | Performed by: FAMILY MEDICINE

## (undated) DEVICE — INTENDED FOR TISSUE SEPARATION, AND OTHER PROCEDURES THAT REQUIRE A SHARP SURGICAL BLADE TO PUNCTURE OR CUT.: Brand: BARD-PARKER SAFETY BLADES SIZE 15, STERILE

## (undated) DEVICE — STERILE POLYISOPRENE POWDER-FREE SURGICAL GLOVES: Brand: PROTEXIS

## (undated) DEVICE — CHLORAPREP HI-LITE 26ML ORANGE

## (undated) DEVICE — BLADE SHAVER DISSECTOR 4MM 13CM COOLCUT

## (undated) DEVICE — DRAPE SHEET THREE QUARTER

## (undated) DEVICE — T-MAX DISPOSABLE FACE MASK 8 PER BOX

## (undated) DEVICE — 3M™ STERI-DRAPE™ U-DRAPE 1015: Brand: STERI-DRAPE™

## (undated) DEVICE — SUT 2 FIBERLOOP AR-7234

## (undated) DEVICE — PAD GROUNDING ADULT

## (undated) DEVICE — DRAPE STERI 1010 18IN X 12IN

## (undated) DEVICE — TUBING SUCTION 5MM X 12 FT

## (undated) DEVICE — NEEDLE FILTER 5 MICR 19G X 1.5IN

## (undated) DEVICE — 3M™ IOBAN™ 2 ANTIMICROBIAL INCISE DRAPE 6650EZ: Brand: IOBAN™ 2

## (undated) DEVICE — SUT MONOCRYL 4-0 PS-2 27 IN Y426H

## (undated) DEVICE — SYRINGE 3ML LL

## (undated) DEVICE — INTENDED FOR TISSUE SEPARATION, AND OTHER PROCEDURES THAT REQUIRE A SHARP SURGICAL BLADE TO PUNCTURE OR CUT.: Brand: BARD-PARKER SAFETY BLADES SIZE 10, STERILE

## (undated) DEVICE — KIT BIO-TENODESIS BIOABS

## (undated) DEVICE — SCD SEQUENTIAL COMPRESSION COMFORT SLEEVE MEDIUM KNEE LENGTH: Brand: KENDALL SCD

## (undated) DEVICE — STERILE POLYISOPRENE POWDER-FREE SURGICAL GLOVES WITH EMOLLIENT COATING: Brand: PROTEXIS

## (undated) DEVICE — TUBING ARTHROSCOPY DUALWAVE OUTFLOW TUBE SET

## (undated) DEVICE — 3M™ IOBAN™ 2 ANTIMICROBIAL INCISE DRAPE 6648EZ: Brand: IOBAN™ 2

## (undated) DEVICE — SMOKE EVACUATION TUBING WITH 7/8" HOSE TO HOSE CONNECTOR: Brand: BUFFALO FILTER

## (undated) DEVICE — STIRRUP STRAP ADULT DISP

## (undated) DEVICE — COBAN 4 IN STERILE

## (undated) DEVICE — ABDOMINAL PAD: Brand: DERMACEA

## (undated) DEVICE — TUBING ARTHROSCOPIC WAVE  MAIN PUMP

## (undated) DEVICE — LAMINECTOMY ARM CRADLE FOAM POSITIONER: Brand: CARDINAL HEALTH

## (undated) DEVICE — SHOULDER STABILIZATION KIT,                                    DISPOSABLE 12 PER BOX

## (undated) DEVICE — 3M™ MICROFOAM™ SURGICAL TAPE 4 ROLLS/CARTON 6 CARTONS/CASE 1528-3: Brand: 3M™ MICROFOAM™

## (undated) DEVICE — INTENDED FOR TISSUE SEPARATION, AND OTHER PROCEDURES THAT REQUIRE A SHARP SURGICAL BLADE TO PUNCTURE OR CUT.: Brand: BARD-PARKER ® SAFETYLOCK CARBON RIB-BACK BLADES

## (undated) DEVICE — SUT VICRYL 2-0 CT-1 27 IN J259H

## (undated) DEVICE — STANDARD SURGICAL GOWN, L: Brand: CONVERTORS

## (undated) DEVICE — 4-PORT MANIFOLD: Brand: NEPTUNE 2

## (undated) DEVICE — LIGHT GLOVE GREEN

## (undated) DEVICE — NEEDLE BLUNT 18 G X 1 1/2IN

## (undated) DEVICE — 3M™ STERI-STRIP™ REINFORCED ADHESIVE SKIN CLOSURES, R1547, 1/2 IN X 4 IN (12 MM X 100 MM), 6 STRIPS/ENVELOPE: Brand: 3M™ STERI-STRIP™

## (undated) DEVICE — STOCKINETTE,IMPERVIOUS,12X48,STERILE: Brand: MEDLINE

## (undated) DEVICE — Device

## (undated) DEVICE — PACK PBDS SHOULDER ARTHROSCOPY RF

## (undated) DEVICE — PENCIL SMOKE EVAC TELESCOPING W/TUBING

## (undated) DEVICE — GAUZE SPONGES,16 PLY: Brand: CURITY

## (undated) DEVICE — SPONGE LAP 18 X 18 IN STRL RFD

## (undated) DEVICE — OCCLUSIVE GAUZE STRIP,3% BISMUTH TRIBROMOPHENATE IN PETROLATUM BLEND: Brand: XEROFORM

## (undated) DEVICE — PUDDLEVAC FLOOR SUCTION DEVICE: Brand: PUDDLEVAC